# Patient Record
Sex: MALE | Race: WHITE | NOT HISPANIC OR LATINO | Employment: OTHER | URBAN - METROPOLITAN AREA
[De-identification: names, ages, dates, MRNs, and addresses within clinical notes are randomized per-mention and may not be internally consistent; named-entity substitution may affect disease eponyms.]

---

## 2017-01-19 ENCOUNTER — ANESTHESIA (OUTPATIENT)
Dept: PERIOP | Facility: HOSPITAL | Age: 62
End: 2017-01-19

## 2017-01-19 ENCOUNTER — HOSPITAL ENCOUNTER (OUTPATIENT)
Facility: HOSPITAL | Age: 62
Setting detail: HOSPITAL OUTPATIENT SURGERY
Discharge: HOME | End: 2017-01-19
Attending: PAIN MEDICINE | Admitting: PAIN MEDICINE

## 2017-01-19 ENCOUNTER — ANESTHESIA EVENT (OUTPATIENT)
Dept: PERIOP | Facility: HOSPITAL | Age: 62
End: 2017-01-19

## 2017-01-19 VITALS
WEIGHT: 182 LBS | TEMPERATURE: 98.2 F | SYSTOLIC BLOOD PRESSURE: 148 MMHG | HEART RATE: 88 BPM | DIASTOLIC BLOOD PRESSURE: 98 MMHG | RESPIRATION RATE: 18 BRPM | OXYGEN SATURATION: 93 % | HEIGHT: 71 IN | BODY MASS INDEX: 25.48 KG/M2

## 2017-01-19 PROBLEM — Z46.2 END OF BATTERY LIFE OF INTRATHECAL INFUSION PUMP: Status: ACTIVE | Noted: 2017-01-19

## 2017-01-19 LAB
DEPRECATED RDW RBC AUTO: 43.5 FL (ref 37–54)
ERYTHROCYTE [DISTWIDTH] IN BLOOD BY AUTOMATED COUNT: 12.8 % (ref 11.3–14.5)
HCT VFR BLD AUTO: 43.8 % (ref 38.9–50.9)
HGB BLD-MCNC: 14.9 G/DL (ref 13.1–17.5)
MCH RBC QN AUTO: 31.5 PG (ref 27–31)
MCHC RBC AUTO-ENTMCNC: 34 G/DL (ref 32–36)
MCV RBC AUTO: 92.6 FL (ref 80–99)
PLATELET # BLD AUTO: 236 10*3/MM3 (ref 150–450)
PMV BLD AUTO: 9.6 FL (ref 6–12)
POTASSIUM BLDA-SCNC: 4.88 MMOL/L (ref 3.5–5.3)
RBC # BLD AUTO: 4.73 10*6/MM3 (ref 4.2–5.76)
WBC NRBC COR # BLD: 8.35 10*3/MM3 (ref 3.5–10.8)

## 2017-01-19 PROCEDURE — 25010000003 CEFAZOLIN IN DEXTROSE 2-4 GM/100ML-% SOLUTION: Performed by: NURSE ANESTHETIST, CERTIFIED REGISTERED

## 2017-01-19 PROCEDURE — 93005 ELECTROCARDIOGRAM TRACING: CPT | Performed by: ANESTHESIOLOGY

## 2017-01-19 PROCEDURE — 85027 COMPLETE CBC AUTOMATED: CPT | Performed by: ANESTHESIOLOGY

## 2017-01-19 PROCEDURE — 25010000002 DEXAMETHASONE PER 1 MG: Performed by: NURSE ANESTHETIST, CERTIFIED REGISTERED

## 2017-01-19 PROCEDURE — 25010000002 PROPOFOL 10 MG/ML EMULSION: Performed by: NURSE ANESTHETIST, CERTIFIED REGISTERED

## 2017-01-19 PROCEDURE — C1772 INFUSION PUMP, PROGRAMMABLE: HCPCS | Performed by: PAIN MEDICINE

## 2017-01-19 PROCEDURE — 25010000002 HYDROMORPHONE PER 4 MG: Performed by: NURSE ANESTHETIST, CERTIFIED REGISTERED

## 2017-01-19 PROCEDURE — 25010000002 ONDANSETRON PER 1 MG: Performed by: NURSE ANESTHETIST, CERTIFIED REGISTERED

## 2017-01-19 PROCEDURE — 25010000002 PHENYLEPHRINE PER 1 ML: Performed by: NURSE ANESTHETIST, CERTIFIED REGISTERED

## 2017-01-19 PROCEDURE — 25010000002 FENTANYL CITRATE (PF) 100 MCG/2ML SOLUTION: Performed by: NURSE ANESTHETIST, CERTIFIED REGISTERED

## 2017-01-19 PROCEDURE — 84132 ASSAY OF SERUM POTASSIUM: CPT | Performed by: ANESTHESIOLOGY

## 2017-01-19 DEVICE — THE PROMETRA II PROGRAMMABLE PUMP IS A STERILE, BATTERY-OPERATED, TEARDROP-SHAPED IMPLANTABLE, PROGRAMMABLE INFUSION PUMP, WITH A RIGID TITANIUM HOUSING AND FLOW CONTROLLER SYSTEM, WHICH DISPENSES INFUSATE INTO THE INTRATHECAL SPACE THROUGH AN IMPLANTED INTRATHECAL CATHETER. TO HELP INCREASE SAFETY, THE PROMETRA II PUMP INCORPORATES A SAFETY VALVE (FLOW-ACTIVATED VALVE OR FAV) THAT WILL SHUT OFF DRUG FLOW TO THE PATIENT IN THE EVENT A HIGH FLOW RATE OCCURS, SUCH AS DURING AN MRI. ALL FUNCTIONS OF THE SYSTEM (E.G., DOSING) ARE CONTROLLED EXTERNALLY USING A HAND-HELD, BATTERY-OPERATED PROGRAMMER. THE PROMETRA II PUMP CONTAINS A METAL BELLOWS DRUG RESERVOIR WITH A CAPACITY OF 20 MILLILITERS (ML). THE RESERVOIR PROPELLANT IS STORED WITHIN THE RIGID HOUSING SURROUNDING THE BELLOWS AND PROVIDES THE DRIVING PRESSURE FOR THE PUMP. THE DRIVING PRESSURE ON THE RESERVOIR FORCES THE INFUSATE THROUGH AN OUTLET FILTER (0.22 PM), AND INTO AN ELECTRONICALLY CONTROLLED FLOW METERING VALVE-ACCUMULATOR SUBSYSTEM. THE INFUMORPH PASSES FROM THE FLOW METERING SUBSYSTEM, INTO THE CATHETER ACCESS PORT THEN INTO THE CATHETER FOR DELIVERY TO THE INTRATHECAL SPACE.
Type: IMPLANTABLE DEVICE | Status: FUNCTIONAL
Brand: PROMETRA II PUMP

## 2017-01-19 RX ORDER — NITROGLYCERIN 0.3 MG/1
0.3 TABLET SUBLINGUAL
COMMUNITY

## 2017-01-19 RX ORDER — FAMOTIDINE 10 MG/ML
20 INJECTION, SOLUTION INTRAVENOUS ONCE
Status: DISCONTINUED | OUTPATIENT
Start: 2017-01-19 | End: 2017-01-19 | Stop reason: HOSPADM

## 2017-01-19 RX ORDER — MAGNESIUM HYDROXIDE 1200 MG/15ML
LIQUID ORAL AS NEEDED
Status: DISCONTINUED | OUTPATIENT
Start: 2017-01-19 | End: 2017-01-19 | Stop reason: HOSPADM

## 2017-01-19 RX ORDER — LABETALOL HYDROCHLORIDE 5 MG/ML
5 INJECTION, SOLUTION INTRAVENOUS
Status: DISCONTINUED | OUTPATIENT
Start: 2017-01-19 | End: 2017-01-19 | Stop reason: HOSPADM

## 2017-01-19 RX ORDER — PROPOFOL 10 MG/ML
VIAL (ML) INTRAVENOUS AS NEEDED
Status: DISCONTINUED | OUTPATIENT
Start: 2017-01-19 | End: 2017-01-19 | Stop reason: SURG

## 2017-01-19 RX ORDER — LIDOCAINE HYDROCHLORIDE 10 MG/ML
INJECTION, SOLUTION INFILTRATION; PERINEURAL AS NEEDED
Status: DISCONTINUED | OUTPATIENT
Start: 2017-01-19 | End: 2017-01-19 | Stop reason: SURG

## 2017-01-19 RX ORDER — IPRATROPIUM BROMIDE AND ALBUTEROL SULFATE 2.5; .5 MG/3ML; MG/3ML
3 SOLUTION RESPIRATORY (INHALATION) ONCE AS NEEDED
Status: DISCONTINUED | OUTPATIENT
Start: 2017-01-19 | End: 2017-01-19 | Stop reason: HOSPADM

## 2017-01-19 RX ORDER — FENTANYL CITRATE 50 UG/ML
INJECTION, SOLUTION INTRAMUSCULAR; INTRAVENOUS AS NEEDED
Status: DISCONTINUED | OUTPATIENT
Start: 2017-01-19 | End: 2017-01-19 | Stop reason: SURG

## 2017-01-19 RX ORDER — BUPIVACAINE HYDROCHLORIDE AND EPINEPHRINE 5; 5 MG/ML; UG/ML
INJECTION, SOLUTION EPIDURAL; INTRACAUDAL; PERINEURAL AS NEEDED
Status: DISCONTINUED | OUTPATIENT
Start: 2017-01-19 | End: 2017-01-19 | Stop reason: HOSPADM

## 2017-01-19 RX ORDER — HYDROMORPHONE HYDROCHLORIDE 1 MG/ML
0.5 INJECTION, SOLUTION INTRAMUSCULAR; INTRAVENOUS; SUBCUTANEOUS
Status: DISCONTINUED | OUTPATIENT
Start: 2017-01-19 | End: 2017-01-19 | Stop reason: HOSPADM

## 2017-01-19 RX ORDER — FENTANYL CITRATE 50 UG/ML
50 INJECTION, SOLUTION INTRAMUSCULAR; INTRAVENOUS
Status: DISCONTINUED | OUTPATIENT
Start: 2017-01-19 | End: 2017-01-19 | Stop reason: HOSPADM

## 2017-01-19 RX ORDER — PROMETHAZINE HYDROCHLORIDE 25 MG/1
25 TABLET ORAL ONCE AS NEEDED
Status: DISCONTINUED | OUTPATIENT
Start: 2017-01-19 | End: 2017-01-19 | Stop reason: HOSPADM

## 2017-01-19 RX ORDER — CLOPIDOGREL BISULFATE 75 MG/1
75 TABLET ORAL DAILY
COMMUNITY

## 2017-01-19 RX ORDER — DEXAMETHASONE SODIUM PHOSPHATE 4 MG/ML
INJECTION, SOLUTION INTRA-ARTICULAR; INTRALESIONAL; INTRAMUSCULAR; INTRAVENOUS; SOFT TISSUE AS NEEDED
Status: DISCONTINUED | OUTPATIENT
Start: 2017-01-19 | End: 2017-01-19 | Stop reason: SURG

## 2017-01-19 RX ORDER — CARVEDILOL 6.25 MG/1
6.25 TABLET ORAL 2 TIMES DAILY WITH MEALS
COMMUNITY

## 2017-01-19 RX ORDER — OXYCODONE AND ACETAMINOPHEN 7.5; 325 MG/1; MG/1
1 TABLET ORAL ONCE AS NEEDED
Status: COMPLETED | OUTPATIENT
Start: 2017-01-19 | End: 2017-01-19

## 2017-01-19 RX ORDER — SODIUM CHLORIDE 0.9 % (FLUSH) 0.9 %
1-10 SYRINGE (ML) INJECTION AS NEEDED
Status: DISCONTINUED | OUTPATIENT
Start: 2017-01-19 | End: 2017-01-19 | Stop reason: HOSPADM

## 2017-01-19 RX ORDER — PROMETHAZINE HYDROCHLORIDE 25 MG/ML
6.25 INJECTION, SOLUTION INTRAMUSCULAR; INTRAVENOUS ONCE AS NEEDED
Status: DISCONTINUED | OUTPATIENT
Start: 2017-01-19 | End: 2017-01-19 | Stop reason: HOSPADM

## 2017-01-19 RX ORDER — TRAZODONE HYDROCHLORIDE 50 MG/1
50 TABLET ORAL NIGHTLY
COMMUNITY
End: 2021-12-06

## 2017-01-19 RX ORDER — PROMETHAZINE HYDROCHLORIDE 25 MG/1
25 SUPPOSITORY RECTAL ONCE AS NEEDED
Status: DISCONTINUED | OUTPATIENT
Start: 2017-01-19 | End: 2017-01-19 | Stop reason: HOSPADM

## 2017-01-19 RX ORDER — FLUVOXAMINE MALEATE 50 MG/1
100 TABLET, COATED ORAL NIGHTLY
COMMUNITY
End: 2022-03-04

## 2017-01-19 RX ORDER — CEFAZOLIN SODIUM 2 G/100ML
INJECTION, SOLUTION INTRAVENOUS AS NEEDED
Status: DISCONTINUED | OUTPATIENT
Start: 2017-01-19 | End: 2017-01-19 | Stop reason: SURG

## 2017-01-19 RX ORDER — BUSPIRONE HYDROCHLORIDE 15 MG/1
30 TABLET ORAL 2 TIMES DAILY
COMMUNITY
End: 2022-03-04

## 2017-01-19 RX ORDER — FAMOTIDINE 20 MG/1
20 TABLET, FILM COATED ORAL ONCE
Status: COMPLETED | OUTPATIENT
Start: 2017-01-19 | End: 2017-01-19

## 2017-01-19 RX ORDER — ATORVASTATIN CALCIUM 40 MG/1
40 TABLET, FILM COATED ORAL NIGHTLY
COMMUNITY
End: 2022-03-11

## 2017-01-19 RX ORDER — MELOXICAM 15 MG/1
15 TABLET ORAL DAILY PRN
COMMUNITY

## 2017-01-19 RX ORDER — LIDOCAINE HYDROCHLORIDE 10 MG/ML
1 INJECTION, SOLUTION EPIDURAL; INFILTRATION; INTRACAUDAL; PERINEURAL ONCE
Status: COMPLETED | OUTPATIENT
Start: 2017-01-19 | End: 2017-01-19

## 2017-01-19 RX ORDER — ONDANSETRON 2 MG/ML
INJECTION INTRAMUSCULAR; INTRAVENOUS AS NEEDED
Status: DISCONTINUED | OUTPATIENT
Start: 2017-01-19 | End: 2017-01-19 | Stop reason: SURG

## 2017-01-19 RX ORDER — LIDOCAINE HYDROCHLORIDE AND EPINEPHRINE 10; 10 MG/ML; UG/ML
INJECTION, SOLUTION INFILTRATION; PERINEURAL AS NEEDED
Status: DISCONTINUED | OUTPATIENT
Start: 2017-01-19 | End: 2017-01-19 | Stop reason: HOSPADM

## 2017-01-19 RX ORDER — LISINOPRIL 5 MG/1
5 TABLET ORAL 2 TIMES DAILY
COMMUNITY

## 2017-01-19 RX ORDER — SODIUM CHLORIDE, SODIUM LACTATE, POTASSIUM CHLORIDE, CALCIUM CHLORIDE 600; 310; 30; 20 MG/100ML; MG/100ML; MG/100ML; MG/100ML
9 INJECTION, SOLUTION INTRAVENOUS CONTINUOUS
Status: DISCONTINUED | OUTPATIENT
Start: 2017-01-19 | End: 2017-01-19 | Stop reason: HOSPADM

## 2017-01-19 RX ORDER — CLOPIDOGREL BISULFATE 75 MG/1
75 TABLET ORAL ONCE
Status: COMPLETED | OUTPATIENT
Start: 2017-01-19 | End: 2017-01-19

## 2017-01-19 RX ORDER — OMEPRAZOLE 20 MG/1
20 CAPSULE, DELAYED RELEASE ORAL DAILY
COMMUNITY
End: 2022-06-24

## 2017-01-19 RX ORDER — ASPIRIN 81 MG/1
81 TABLET, CHEWABLE ORAL DAILY
COMMUNITY

## 2017-01-19 RX ADMIN — PROPOFOL 200 MG: 10 INJECTION, EMULSION INTRAVENOUS at 13:06

## 2017-01-19 RX ADMIN — CLOPIDOGREL BISULFATE 75 MG: 75 TABLET ORAL at 12:59

## 2017-01-19 RX ADMIN — DEXAMETHASONE SODIUM PHOSPHATE 8 MG: 4 INJECTION, SOLUTION INTRAMUSCULAR; INTRAVENOUS at 13:11

## 2017-01-19 RX ADMIN — FENTANYL CITRATE 50 MCG: 50 INJECTION, SOLUTION INTRAMUSCULAR; INTRAVENOUS at 14:40

## 2017-01-19 RX ADMIN — PHENYLEPHRINE HYDROCHLORIDE 100 MCG: 10 INJECTION INTRAVENOUS at 13:25

## 2017-01-19 RX ADMIN — PHENYLEPHRINE HYDROCHLORIDE 100 MCG: 10 INJECTION INTRAVENOUS at 13:35

## 2017-01-19 RX ADMIN — FENTANYL CITRATE 50 MCG: 50 INJECTION, SOLUTION INTRAMUSCULAR; INTRAVENOUS at 14:55

## 2017-01-19 RX ADMIN — HYDROMORPHONE HYDROCHLORIDE 0.5 MG: 1 INJECTION, SOLUTION INTRAMUSCULAR; INTRAVENOUS; SUBCUTANEOUS at 15:10

## 2017-01-19 RX ADMIN — ONDANSETRON 4 MG: 2 INJECTION INTRAMUSCULAR; INTRAVENOUS at 13:51

## 2017-01-19 RX ADMIN — HYDROMORPHONE HYDROCHLORIDE 0.5 MG: 1 INJECTION, SOLUTION INTRAMUSCULAR; INTRAVENOUS; SUBCUTANEOUS at 15:30

## 2017-01-19 RX ADMIN — FENTANYL CITRATE 25 MCG: 50 INJECTION, SOLUTION INTRAMUSCULAR; INTRAVENOUS at 13:06

## 2017-01-19 RX ADMIN — OXYCODONE HYDROCHLORIDE AND ACETAMINOPHEN 1 TABLET: 7.5; 325 TABLET ORAL at 15:20

## 2017-01-19 RX ADMIN — CEFAZOLIN SODIUM 2 G: 2 INJECTION, SOLUTION INTRAVENOUS at 13:10

## 2017-01-19 RX ADMIN — LIDOCAINE HYDROCHLORIDE 1 ML: 10 INJECTION, SOLUTION EPIDURAL; INFILTRATION; INTRACAUDAL; PERINEURAL at 12:40

## 2017-01-19 RX ADMIN — FAMOTIDINE 20 MG: 20 TABLET, FILM COATED ORAL at 12:40

## 2017-01-19 RX ADMIN — SODIUM CHLORIDE, POTASSIUM CHLORIDE, SODIUM LACTATE AND CALCIUM CHLORIDE 9 ML/HR: 600; 310; 30; 20 INJECTION, SOLUTION INTRAVENOUS at 12:40

## 2017-01-19 RX ADMIN — LIDOCAINE HYDROCHLORIDE 50 MG: 10 INJECTION, SOLUTION INFILTRATION; PERINEURAL at 13:06

## 2017-01-19 NOTE — H&P
Patient Care Team:      Chief complaint : Chronic pain in low back.    Subjective:    Patient is a 61 y.o.male presents with c/o chronic pain and is here for pain pump placement.    Review of Systems:  General ROS: negative for - chills, fatigue, fever or malaise  Cardiovascular ROS: no chest pain or dyspnea on exertion. H/o CAD with 3 stents.  Respiratory ROS: no cough, shortness of breath, or wheezing      Allergies:   Allergies   Allergen Reactions   • Penicillins Shortness Of Breath          Latex: Denies  Contrast Dye: Denies    Home Meds    Prescriptions Prior to Admission   Medication Sig Dispense Refill Last Dose   • aspirin 81 MG chewable tablet Chew 81 mg Daily.   1/19/2017 at 0800   • atorvastatin (LIPITOR) 40 MG tablet Take 40 mg by mouth Every Night.   1/19/2017 at 0800   • busPIRone (BUSPAR) 15 MG tablet Take 30 mg by mouth 2 (Two) Times a Day.   1/19/2017 at 0800   • carvedilol (COREG) 6.25 MG tablet Take 6.25 mg by mouth 2 (Two) Times a Day With Meals.   1/19/2017 at 0800   • clopidogrel (PLAVIX) 75 MG tablet Take 75 mg by mouth Daily.   1/18/2017 at Unknown time   • fluvoxaMINE (LUVOX) 50 MG tablet Take 100 mg by mouth Every Night. 200 mg in AM  100 mg in PM   1/19/2017 at 0800   • lisinopril (PRINIVIL,ZESTRIL) 5 MG tablet Take 5 mg by mouth 2 (Two) Times a Day.   1/19/2017 at 0800   • meloxicam (MOBIC) 15 MG tablet Take 15 mg by mouth Daily As Needed.   Past Week at Unknown time   • omeprazole (priLOSEC) 20 MG capsule Take 20 mg by mouth Daily.   1/19/2017 at 0800   • traZODone (DESYREL) 50 MG tablet Take 50 mg by mouth Every Night.   Past Week at Unknown time   • nitroglycerin (NITROSTAT) 0.3 MG SL tablet Place 0.3 mg under the tongue Every 5 (Five) Minutes As Needed for chest pain. Take no more than 3 doses in 15 minutes.   Unknown at Unknown time     PMH:   Past Medical History   Diagnosis Date   • Arthritis    • Coronary artery disease    • Hypertension    • Sleep apnea      PSH:    Past  Surgical History   Procedure Laterality Date   • Lumbar discectomy anterior posterior fusion instrumentation  2011     [facility]    • Pain pump insertion/revision     • Appendectomy     • Cardiac catheterization  2015     3 stents     Immunization History: pneumo: No  Flu: No   Tetanus: Unknown    Social History:   Tobacco: Former quit 2012   Alcohol: No      Physical Exam:    General Appearance:    Alert, cooperative, no distress, appears stated age   Head:    Normocephalic, without obvious abnormality, atraumatic   Lungs:     Clear to auscultation bilaterally, respirations unlabored    Heart: Regular rate and rhythm, S1 and S2 normal, no murmur, rub    or gallop    Abdomen:    Soft without tenderness   Breast Exam:    deferred   Genitalia:    deferred   Extremities:   Extremities normal, atraumatic, no cyanosis or edema   Skin:   Skin color, texture, turgor normal, no rashes or lesions   Neurologic:   Grossly intact     Results Review: labs were reviewed    Impression: Chronic pain syndrome    Plan: Intrathecal pain pump placement      GHADA Reyna 1/19/2017 12:23 PM

## 2017-01-19 NOTE — ANESTHESIA POSTPROCEDURE EVALUATION
Patient: Fam Tao    Procedure Summary     Date Anesthesia Start Anesthesia Stop Room / Location    01/19/17 1302  [facility] / [facility]       Procedure Diagnosis Surgeon Provider    INTRARHECAL PAIN PUMP REPLACEMENT (N/A ) No diagnosis on file. MD Alex Montgomery MD          Anesthesia Type: general  Last vitals  BP   146/83   Temp   98.1   Pulse  85   Resp   15   SpO2   98     Post Anesthesia Care and Evaluation    Patient location during evaluation: PACU  Patient participation: complete - patient participated  Level of consciousness: awake and alert  Pain score: 0  Pain management: adequate  Airway patency: patent  Anesthetic complications: No anesthetic complications  PONV Status: none  Cardiovascular status: hemodynamically stable and acceptable  Respiratory status: nonlabored ventilation, acceptable and nasal cannula  Hydration status: acceptable

## 2017-01-19 NOTE — OP NOTE
DATE OF PROCEDURE:  01/19/2017    PREOPERATIVE DIAGNOSES:  1. Status post laminectomy syndrome.   2. Pain pump battery end of life.   3. Medical drug dependence.     POSTOPERATIVE DIAGNOSES:  1. Status post laminectomy syndrome.   2. Pain pump battery end of life.   3. Medical drug dependence.     PROCEDURE PERFORMED: Pain pump replacement with pain pump pocket revision.     SURGEON: Nir Gilman II, MD     ASSISTANT: None.     ANESTHESIA: Per anesthesia. General anesthesia was used. No apparent complications from the anesthesia.     DESCRIPTION OF PROCEDURE: After obtaining informed consent, the patient was taken to the OR where he was placed in the supine position. Appropriate monitors, including blood pressure cuff, EKG and pulse oximetry were placed. The area of the left lower abdomen was then prepped with a DuraPrep solution and draped in a sterile fashion. An Ioban was placed over the surgical site. I infiltrated approximately 20 mL of a 0.5% bupivacaine with epinephrine and 1% lidocaine solution along the 7 cm incision line on the abdomen. An elliptical incision was made to excise the old scar. Tissues were then dissected down to the level of the pain pump which was carefully exposed. The pain pump was then extracted from the existing pain pump pocket taking care not to damage the intrathecal catheter. This catheter was then freed up from the scar tissue and the Dacron pouch surrounding the pain pump freed from the subcutaneous fat. This allowed me to remodel the pain pump pocket to accommodate the new Flowonix Pain Pump. The pump was prepared prior to implant with a solution of 20 mL of 8 mg/mL Dilaudid and 16 mg/mL of bupivacaine. The pain pump was primed prior to placement. The existing intrathecal catheter  of 71 cm length was then connected to the Flowonix Pain Pump. A sutureless sleeve was used to secure the catheter to the pain pump. The pain pump was then placed into the revised pocket after  copious irrigation with orthopedic antibiotic irrigation. This site was then closed first using a 2-0 interrupted Vicryl stitch followed by a 3-0 subcuticular stitch. SureClose was applied to the skin and a Steri-Strip applied to the incision sites. Sterile Tegaderm dressing was applied. The patient was then transferred to the recovery room where he was noted to be neurologically intact. He will be discharged to home. He will follow up on 01/27/2017 for removal of suture.      MD FAUZIA Mcdaniels II/jaspreet  DD: 01/19/2017 16:37:31  DT: 01/19/2017 18:20:13  Voice Rec. ID #[id number]  Voice Original ID #[id number]  Doc ID #[id number]  Rev. #0  cc:

## 2017-01-19 NOTE — IP AVS SNAPSHOT
AFTER VISIT SUMMARY             Fam Tao           About your hospitalization     You were admitted on:  January 19, 2017 You last received care in the:  [facility] OR       Procedures & Surgeries      Procedure(s) (LRB):  INTRARHECAL PAIN PUMP REPLACEMENT (N/A)     1/19/2017     Surgeon(s):  Nir Gilman MD  -------------------      Medications    If you or your caregiver advised us that you are currently taking a medication and that medication is marked below as “Resume”, this simply indicates that we have reviewed those medications to make sure our new therapy recommendations do not interfere.  If you have concerns about medications other than those new ones which we are prescribing today, please consult the physician who prescribed them (or your primary physician).  Our review of your home medications is not meant to indicate that we are directing their use.             Your Medications      CONTINUE taking these medications     aspirin 81 MG chewable tablet   Chew 81 mg Daily.           atorvastatin 40 MG tablet   Take 40 mg by mouth Every Night.   Commonly known as:  LIPITOR           busPIRone 15 MG tablet   Take 30 mg by mouth 2 (Two) Times a Day.   Commonly known as:  BUSPAR           carvedilol 6.25 MG tablet   Take 6.25 mg by mouth 2 (Two) Times a Day With Meals.   Commonly known as:  COREG           clopidogrel 75 MG tablet   Take 75 mg by mouth Daily.   Last time this was given:  1/19/2017 12:59 PM   Commonly known as:  PLAVIX           fluvoxaMINE 50 MG tablet   Take 100 mg by mouth Every Night. 200 mg in  mg in PM   Commonly known as:  LUVOX           lisinopril 5 MG tablet   Take 5 mg by mouth 2 (Two) Times a Day.   Commonly known as:  PRINIVIL,ZESTRIL           meloxicam 15 MG tablet   Take 15 mg by mouth Daily As Needed.   Commonly known as:  MOBIC           NITROSTAT 0.3 MG SL tablet   Place 0.3 mg under the tongue Every 5 (Five) Minutes As Needed for chest  pain. Take no more than 3 doses in 15 minutes.   Generic drug:  nitroglycerin           omeprazole 20 MG capsule   Take 20 mg by mouth Daily.   Commonly known as:  priLOSEC           traZODone 50 MG tablet   Take 50 mg by mouth Every Night.   Commonly known as:  DESYREL                      Your Medications      Your Medication List           Morning Noon Evening Bedtime As Needed    aspirin 81 MG chewable tablet   Chew 81 mg Daily.                                atorvastatin 40 MG tablet   Take 40 mg by mouth Every Night.   Commonly known as:  LIPITOR                                busPIRone 15 MG tablet   Take 30 mg by mouth 2 (Two) Times a Day.   Commonly known as:  BUSPAR                                carvedilol 6.25 MG tablet   Take 6.25 mg by mouth 2 (Two) Times a Day With Meals.   Commonly known as:  COREG                                clopidogrel 75 MG tablet   Take 75 mg by mouth Daily.   Commonly known as:  PLAVIX                                fluvoxaMINE 50 MG tablet   Take 100 mg by mouth Every Night. 200 mg in  mg in PM   Commonly known as:  LUVOX                                lisinopril 5 MG tablet   Take 5 mg by mouth 2 (Two) Times a Day.   Commonly known as:  PRINIVIL,ZESTRIL                                meloxicam 15 MG tablet   Take 15 mg by mouth Daily As Needed.   Commonly known as:  MOBIC                                NITROSTAT 0.3 MG SL tablet   Place 0.3 mg under the tongue Every 5 (Five) Minutes As Needed for chest pain. Take no more than 3 doses in 15 minutes.   Generic drug:  nitroglycerin                                omeprazole 20 MG capsule   Take 20 mg by mouth Daily.   Commonly known as:  priLOSEC                                traZODone 50 MG tablet   Take 50 mg by mouth Every Night.   Commonly known as:  DESYREL                                         Instructions for After Discharge        Discharge References/Attachments     GENERAL ANESTHESIA, ADULT, CARE AFTER  (ENGLISH)    IMPLANTABLE PAIN PUMP, HOME CARE (ENGLISH)       Follow-ups for After Discharge        Follow-up Information     Follow up with Nir Gilman MD Follow up on 2017.    Specialty:  Pain Medicine    Why:  2:40PM    Contact information:    [address]  [place] [address] [place] [id number]  [phone]        [employer] Signup     [facility] allows you to send messages to your doctor, view your test results, renew your prescriptions, schedule appointments, and more. To sign up, go to [employer] and click on the Sign Up Now link in the New User? box. Enter your [employer] Activation Code exactly as it appears below along with the last four digits of your Social Security Number and your Date of Birth () to complete the sign-up process. If you do not sign up before the expiration date, you must request a new code.    [employer] Activation Code: [email or web address]  Expires: 2017  2:55 PM    If you have questions, you can email [email or web address] or call [phone] to talk to our [employer] staff. Remember, [employer] is NOT to be used for urgent needs. For medical emergencies, dial 911.           Summary of Your Hospitalization        Reason for Hospitalization     Your primary diagnosis was:  Fitting/Adjustment Of Devices Related To Nervous System/Special Senses      Care Providers     Provider Service Role Specialty    Nir Gilman MD Pain Management Attending Provider Pain Medicine    Nir Gilman MD Pain Management Surgeon  Pain Medicine      Your Allergies  Date Reviewed: 2017    Allergen Reactions    Penicillins Shortness Of Breath      Patient Belongings Returned     Document Return of Belongings Flowsheet     Were the patient bedside belongings sent home?   --   Belongings Retrieved from Security & Sent Home   --    Belongings Sent to Safe   --   Medications Retrieved from Pharmacy & Sent Home   --              More Information      General  Anesthesia, Adult, Care After  Refer to this sheet in the next few weeks. These instructions provide you with information on caring for yourself after your procedure. Your health care provider may also give you more specific instructions. Your treatment has been planned according to current medical practices, but problems sometimes occur. Call your health care provider if you have any problems or questions after your procedure.  WHAT TO EXPECT AFTER THE PROCEDURE  After the procedure, it is typical to experience:  · Sleepiness.  · Nausea and vomiting.  HOME CARE INSTRUCTIONS  · For the first 24 hours after general anesthesia:  ¨ Have a responsible person with you.  ¨ Do not drive a car. If you are alone, do not take public transportation.  ¨ Do not drink alcohol.  ¨ Do not take medicine that has not been prescribed by your health care provider.  ¨ Do not sign important papers or make important decisions.  ¨ You may resume a normal diet and activities as directed by your health care provider.  · Change bandages (dressings) as directed.  · If you have questions or problems that seem related to general anesthesia, call the hospital and ask for the anesthetist or anesthesiologist on call.  SEEK MEDICAL CARE IF:  · You have nausea and vomiting that continue the day after anesthesia.  · You develop a rash.  SEEK IMMEDIATE MEDICAL CARE IF:   · You have difficulty breathing.  · You have chest pain.  · You have any allergic problems.     This information is not intended to replace advice given to you by your health care provider. Make sure you discuss any questions you have with your health care provider.     Document Released: 03/26/2002 Document Revised: 01/08/2016 Document Reviewed: 04/17/2013  [employer] Interactive Patient Education ©2016 [employer]          Implantable Pain Pump Home Care  An implanted pain pump is a small device, about the size of a hockey puck. It is put under your skin (implanted) during surgery.  Attached to it is a catheter (small plastic tube). The tube goes into either a vein or into the space around your spinal cord. The pump has a space (reservoir) where the pain medication is stored. The device pumps it from the reservoir into your body at a regular pre-set rate. Sometimes your health care provider will set the pump to give you pain medicine when you need it. This might be in a steady flow. Or, it might be different amounts at different times. The reservoir can be easily refilled when it runs low.  An implanted pump puts the medication right where it needs to go to relieve your pain. This often means less medication will be needed. That should result in fewer side effects.   HOME CARE   An implanted pump can stay in place for a long time. But do not worry: It can be removed at any time. For example, if your condition changes or if the pump no longer helps, it can be taken out. While you have an implanted pump:  · You will need to make regular visits to your health care provider. During these visits:    The pump will be refilled with pain medicine. Your health care provider will insert a needle through your skin into the pump. Medicine will flow through the needle into the reservoir. Refills are usually needed every 4 to 8 weeks. Refill time varies from person to person. Be sure to ask how often your pump will need a refill.    Your medication can be adjusted. The amount you get can be changed. How often the medicine is pumped into your body also can be changed. These changes will depend on how well the device is reducing your pain. Be honest when describing your pain to your health care providers. They need this information to make sure you get the right amount and right type of pain medicine. It also will depend on side effects. Explain any symptoms you have, even if you do not think they have anything to do with your pain pump. Tell your caregiver if you feel sick to your stomach, if you are overly  sleepy, or if your skin itches. Your health care provider will know whether the medicine might be causing these or other side effects. The goal is to give you enough medicine to ease your pain but not so much that you have side effects.    The pump will be checked to make sure it is working properly. The battery in the pump often lasts up to ten years. The pump will beep if a new battery is needed. It also will beep if it needs a refill or if there is another problem.  Ask your health care provider whether you can take over-the-counter medicines for aches or fever. Which other medicines you can take could depend on the type of pain medicine in your pump.   At first, you will need to restrict your movement and activities.  · For 6 to 8 weeks:    Do not sleep on your stomach.    Avoid excessive bending and stretching.    Do not raise your arms over your head.    Do not lift anything that weighs more than 5 pounds.  · Do not drive for at least two weeks (or until your health care provider says it is okay).  · Do not take a tub bath for about a month. Shower only.  · Do not do work around the house (inside or outside) until your health care provider gives you the go-ahead. For example, do not load the . Do not vacuum. Do not mow the yard.  Over the long term, you may not have many restrictions. Just remember to return to your regular activities gradually. Ask your health care provider whether physical therapy would help. You still need to be careful in some situations:  · Always keep an eye on the skin that covers the pump. Call your health care provider if it becomes red, warm or swollen. Call if the area is painful or if the incision starts to leak blood or any liquid. These could be signs of infection.  · Get a special ID card that proves you have an implanted pump. It is called an Implanted Device Identification Card. To get this, you might need a letter or form signed by your health care provider. Ask  your health care provider how to get this ID card. You will need this card when traveling. It is important to have in case of an emergency. Carry it with you at all times.  · You should alert airport security checkers that you have an implanted pump. The pump will set off the alarms in the security checkpoints. Show your ID card. Ask to be checked with a security wand.  · Do not worry about being around microwave ovens, cell phones, or other electronic devices. They will not harm the pump.  · Make sure that family members and close friends know that you have an implanted pump. You might want to tell some co-workers, too. People need to know this in case of an emergency.  SEEK MEDICAL CARE IF:   · An incision becomes red, swollen, or painful.  · An incision leaks fluid or blood.  · You feel sick to your stomach, you feel more sleepy than usual, or you have itchy skin.  · You have trouble urinating or having a bowel movement.  · You have a bad headache or back pain.  · Your pain is not being relieved by the pump.  · You develop a fever of more than 100.5° F (38.1° C).  SEEK IMMEDIATE MEDICAL CARE IF:  · You have trouble breathing or feel short of breath.  · You have a headache that lasts for more than two days.  · You hear the pump beeping.  · You have sudden symptoms (back pain, weakness in your legs).  · You lose control over urination or bowel movements.  · You develop a fever of more than 102.0° F (38.9° C).     This information is not intended to replace advice given to you by your health care provider. Make sure you discuss any questions you have with your health care provider.     Document Released: 04/05/2010 Document Revised: 01/08/2016 Document Reviewed: 08/08/2016  [employer] Interactive Patient Education ©2016 [employer]         PREVENTING SURGICAL SITE INFECTIONS     Surgical Site Infections FAQs  What is a Surgical Site Infection (SSI)?  A surgical site infection is an infection that occurs after  surgery in the part of the body where the surgery took place. Most patients who have surgery do not develop an infection. However, infections develop in about 1 to 3 out of every 100 patients who have surgery.  Some of the common symptoms of a surgical site infection are:  · Redness and pain around the area where you had surgery  · Drainage of cloudy fluid from your surgical wound  · Fever  Can SSIs be treated?  Yes. Most surgical site infections can be treated with antibiotics. The antibiotic given to you depends on the bacteria (germs) causing the infection. Sometimes patients with SSIs also need another surgery to treat the infection.  What are some of the things that hospitals are doing to prevent SSIs?  To prevent SSIs, doctors, nurses, and other healthcare providers:  · Clean their hands and arms up to their elbows with an antiseptic agent just before the surgery.  · Clean their hands with soap and water or an alcohol-based hand rub before and after caring for each patient.  · May remove some of your hair immediately before your surgery using electric clippers if the hair is in the same area where the procedure will occur. They should not shave you with a razor.  · Wear special hair covers, masks, gowns, and gloves during surgery to keep the surgery area clean.  · Give you antibiotics before your surgery starts. In most cases, you should get antibiotics within 60 minutes before the surgery starts and the antibiotics should be stopped within 24 hours after surgery.  · Clean the skin at the site of your surgery with a special soap that kills germs.  What can I do to help prevent SSIs?  Before your surgery:  · Tell your doctor about other medical problems you may have. Health problems such as allergies, diabetes, and obesity could affect your surgery and your treatment.  · Quit smoking. Patients who smoke get more infections. Talk to your doctor about how you can quit before your surgery.  · Do not shave near  where you will have surgery. Shaving with a razor can irritate your skin and make it easier to develop an infection.  At the time of your surgery:  · Speak up if someone tries to shave you with a razor before surgery. Ask why you need to be shaved and talk with your surgeon if you have any concerns.  · Ask if you will get antibiotics before surgery.  After your surgery:  · Make sure that your healthcare providers clean their hands before examining you, either with soap and water or an alcohol-based hand rub.    If you do not see your providers clean their hands, please ask them to do so.  · Family and friends who visit you should not touch the surgical wound or dressings.  · Family and friends should clean their hands with soap and water or an alcohol-based hand rub before and after visiting you. If you do not see them clean their hands, ask them to clean their hands.  What do I need to do when I go home from the hospital?  · Before you go home, your doctor or nurse should explain everything you need to know about taking care of your wound. Make sure you understand how to care for your wound before you leave the hospital.  · Always clean your hands before and after caring for your wound.  · Before you go home, make sure you know who to contact if you have questions or problems after you get home.  · If you have any symptoms of an infection, such as redness and pain at the surgery site, drainage, or fever, call your doctor immediately.  If you have additional questions, please ask your doctor or nurse.  Developed and co-sponsored by The Society for Healthcare Epidemiology of [place] (SHEA); Infectious Diseases Society of [place] (IDSA); American Hospital Association; Association for Professionals in Infection Control and Epidemiology (APIC); Centers for Disease Control and Prevention (CDC); and The Joint Commission.     This information is not intended to replace advice given to you by your health care provider. Make  sure you discuss any questions you have with your health care provider.     Document Released: 12/23/2014 Document Revised: 01/08/2016 Document Reviewed: 03/02/2016  [employer] Interactive Patient Education ©2016 [employer]             SYMPTOMS OF A STROKE    Call 911 or have someone take you to the Emergency Department if you have any of the following:    · Sudden numbness or weakness of your face, arm or leg especially on one side of the body  · Sudden confusion, diffiiculty speaking or trouble understanding   · Changes in your vision or loss of sight in one eye  · Sudden severe headache with no known cause  · sudden dizziness, trouble walking, loss of balance or coordination    It is important to seek emergency care right away if you have further stroke symptoms. If you get emergency help quickly, the powerful clot-dissolving medicines can reduce the disabilities caused by a stroke.     For more information:    American Stroke Association  0-743-3-STROKE  www.strokeassociation.org           IF YOU SMOKE OR USE TOBACCO PLEASE READ THE FOLLOWING:    Why is smoking bad for me?  Smoking increases the risk of heart disease, lung disease, vascular disease, stroke, and cancer.     If you smoke, STOP!    If you would like more information on quitting smoking, please visit the appssavvy website: www.EyeEm/Benbriaate/healthier-together/smoke   This link will provide additional resources including the QUIT line and the Beat the Pack support groups.     For more information:    American Cancer Society  ([phone] [phone]    American Heart Association  1-228.979.5076               YOU ARE THE MOST IMPORTANT FACTOR IN YOUR RECOVERY.     Follow all instructions carefully.     I have reviewed my discharge instructions with my nurse, including the following information, if applicable:     Information about my illness and diagnosis   Follow up appointments (including lab draws)   Wound Care   Equipment  Needs   Medications (new and continuing) along with side effects   Preventative information such as vaccines and smoking cessations   Diet   Pain   I know when to contact my Doctor's office or seek emergency care      I want my nurse to describe the side effects of my medications: YES NO   If the answer is no, I understand the side effects of my medications: YES NO   My nurse described the side effects of my medications in a way that I could understand: YES NO   I have taken my personal belongings and my own medications with me at discharge: YES NO            I have received this information and my questions have been answered. I have discussed any concerns I see with this plan with the nurse or physician. I understand these instructions.    Signature of Patient or Responsible Person: _____________________________________    Date: _________________  Time: __________________    Signature of Healthcare Provider: _______________________________________  Date: _________________  Time: __________________

## 2017-01-19 NOTE — ANESTHESIA PREPROCEDURE EVALUATION
Anesthesia Evaluation      Airway   Mallampati: I  TM distance: <3 FB  Neck ROM: full  no difficulty expected  Dental - normal exam     Pulmonary - normal exam   Cardiovascular - normal exam  (+) past MI , CAD, cardiac stents     Neuro/Psych  GI/Hepatic/Renal/Endo      Musculoskeletal     Abdominal  - normal exam    Bowel sounds: normal.   Substance History      OB/GYN          Other                             Anesthesia Plan    ASA 3     general     intravenous induction

## 2017-12-27 ENCOUNTER — HOSPITAL ENCOUNTER (OUTPATIENT)
Age: 62
End: 2017-12-27
Payer: MEDICARE

## 2017-12-27 DIAGNOSIS — I25.10: ICD-10-CM

## 2017-12-27 DIAGNOSIS — R07.9: Primary | ICD-10-CM

## 2017-12-27 DIAGNOSIS — E78.5: ICD-10-CM

## 2017-12-27 DIAGNOSIS — I10: ICD-10-CM

## 2017-12-27 PROCEDURE — 36415 COLL VENOUS BLD VENIPUNCTURE: CPT

## 2017-12-27 PROCEDURE — 80048 BASIC METABOLIC PNL TOTAL CA: CPT

## 2017-12-27 PROCEDURE — 93005 ELECTROCARDIOGRAM TRACING: CPT

## 2017-12-27 PROCEDURE — 82550 ASSAY OF CK (CPK): CPT

## 2017-12-27 PROCEDURE — 82553 CREATINE MB FRACTION: CPT

## 2017-12-27 PROCEDURE — 84484 ASSAY OF TROPONIN QUANT: CPT

## 2018-07-09 ENCOUNTER — HOSPITAL ENCOUNTER (OUTPATIENT)
Age: 63
End: 2018-07-09
Payer: MEDICARE

## 2018-07-09 DIAGNOSIS — I10: ICD-10-CM

## 2018-07-09 DIAGNOSIS — E78.5: Primary | ICD-10-CM

## 2018-07-09 LAB
ALBUMIN LEVEL: 4.1 GM/DL (ref 3.4–5)
ALP ISO SERPL-ACNC: 124 U/L (ref 46–116)
ALT SERPLBLD-CCNC: 48 U/L (ref 12–78)
AST SERPL QL: 25 U/L (ref 15–37)
BILIRUB DIRECT SERPL-MCNC: 0.1 MG/DL (ref 0–0.2)
BILIRUB INDIRECT SERPL-MCNC: 0.3 MG/DL (ref 0–0.9)
BILIRUBIN,TOTAL: 0.4 MG/DL (ref 0.2–1)
CHOLEST SPEC-SCNC: 226 MG/DL (ref 140–200)
HDLC SERPL-MCNC: 59 MG/DL (ref 27–67)
PROT SERPL-MCNC: 7.2 GM/DL (ref 6.4–8.2)
TRIGLYCERIDES: 63 MG/DL (ref 30–200)

## 2018-07-09 PROCEDURE — 36415 COLL VENOUS BLD VENIPUNCTURE: CPT

## 2018-07-09 PROCEDURE — 80076 HEPATIC FUNCTION PANEL: CPT

## 2018-07-09 PROCEDURE — 80061 LIPID PANEL: CPT

## 2018-09-25 ENCOUNTER — HOSPITAL ENCOUNTER (OUTPATIENT)
Age: 63
End: 2018-09-25
Payer: MEDICARE

## 2018-09-25 DIAGNOSIS — I42.9: ICD-10-CM

## 2018-09-25 DIAGNOSIS — R06.09: ICD-10-CM

## 2018-09-25 DIAGNOSIS — J44.9: ICD-10-CM

## 2018-09-25 DIAGNOSIS — Z87.891: ICD-10-CM

## 2018-09-25 DIAGNOSIS — I25.10: ICD-10-CM

## 2018-09-25 DIAGNOSIS — E78.4: Primary | ICD-10-CM

## 2018-09-25 DIAGNOSIS — N18.2: ICD-10-CM

## 2018-09-25 LAB
ALBUMIN LEVEL: 4.2 GM/DL (ref 3.4–5)
ALP ISO SERPL-ACNC: 111 U/L (ref 46–116)
ALT SERPLBLD-CCNC: 57 U/L (ref 12–78)
AST SERPL QL: 26 U/L (ref 15–37)
BILIRUB DIRECT SERPL-MCNC: 0.1 MG/DL (ref 0–0.2)
BILIRUB INDIRECT SERPL-MCNC: 0.3 MG/DL (ref 0–0.9)
BILIRUBIN,TOTAL: 0.4 MG/DL (ref 0.2–1)
CHOLEST SPEC-SCNC: 157 MG/DL (ref 140–200)
HDLC SERPL-MCNC: 64 MG/DL (ref 27–67)
PROT SERPL-MCNC: 7.3 GM/DL (ref 6.4–8.2)
TRIGLYCERIDES: 75 MG/DL (ref 30–200)

## 2018-09-25 PROCEDURE — 80061 LIPID PANEL: CPT

## 2018-09-25 PROCEDURE — 36415 COLL VENOUS BLD VENIPUNCTURE: CPT

## 2018-09-25 PROCEDURE — 80076 HEPATIC FUNCTION PANEL: CPT

## 2020-09-02 ENCOUNTER — HOSPITAL ENCOUNTER (OUTPATIENT)
Age: 65
End: 2020-09-02
Payer: MEDICARE

## 2020-09-02 DIAGNOSIS — I10: ICD-10-CM

## 2020-09-02 DIAGNOSIS — R42: ICD-10-CM

## 2020-09-02 DIAGNOSIS — E78.5: Primary | ICD-10-CM

## 2020-09-02 DIAGNOSIS — R53.83: ICD-10-CM

## 2020-09-02 DIAGNOSIS — I20.9: ICD-10-CM

## 2020-09-02 DIAGNOSIS — R60.9: ICD-10-CM

## 2020-09-02 PROCEDURE — 93306 TTE W/DOPPLER COMPLETE: CPT

## 2020-09-02 PROCEDURE — 78452 HT MUSCLE IMAGE SPECT MULT: CPT

## 2020-09-02 PROCEDURE — 93017 CV STRESS TEST TRACING ONLY: CPT

## 2020-09-02 PROCEDURE — A9502 TC99M TETROFOSMIN: HCPCS

## 2021-03-22 ENCOUNTER — HOSPITAL ENCOUNTER (OUTPATIENT)
Age: 66
End: 2021-03-22
Payer: MEDICARE

## 2021-03-22 DIAGNOSIS — I20.9: ICD-10-CM

## 2021-03-22 DIAGNOSIS — E78.5: Primary | ICD-10-CM

## 2021-03-22 LAB
ALBUMIN LEVEL: 4.4 G/DL (ref 3.5–5)
ALP ISO SERPL-ACNC: 113 U/L (ref 38–126)
ALT SERPLBLD-CCNC: 48 U/L (ref 12–78)
AST SERPL QL: 42 U/L (ref 17–59)
BILIRUB DIRECT SERPL-MCNC: 0 MG/DL (ref 0–0.4)
BILIRUB INDIRECT SERPL-MCNC: 0.4 MG/DL (ref 0–0.9)
BILIRUB INDIRECT SERPL-MCNC: 0.4 MG/DL (ref 0–1.1)
BILIRUBIN,TOTAL: 0.4 MG/DL (ref 0.2–1.3)
CHOLEST SPEC-SCNC: 191 MG/DL (ref 140–200)
HDLC SERPL-MCNC: 61 MG/DL (ref 40–60)
PROT SERPL-MCNC: 7.6 G/DL (ref 6.3–8.2)
TRIGLYCERIDES: 86 MG/DL (ref 30–150)

## 2021-03-22 PROCEDURE — 80061 LIPID PANEL: CPT

## 2021-03-22 PROCEDURE — 80076 HEPATIC FUNCTION PANEL: CPT

## 2021-03-22 PROCEDURE — 36415 COLL VENOUS BLD VENIPUNCTURE: CPT

## 2021-12-06 ENCOUNTER — LAB (OUTPATIENT)
Dept: LAB | Facility: HOSPITAL | Age: 66
End: 2021-12-06

## 2021-12-06 ENCOUNTER — OFFICE VISIT (OUTPATIENT)
Dept: ENDOCRINOLOGY | Facility: CLINIC | Age: 66
End: 2021-12-06

## 2021-12-06 VITALS
SYSTOLIC BLOOD PRESSURE: 118 MMHG | DIASTOLIC BLOOD PRESSURE: 66 MMHG | HEIGHT: 71 IN | WEIGHT: 184 LBS | OXYGEN SATURATION: 98 % | HEART RATE: 87 BPM | BODY MASS INDEX: 25.76 KG/M2

## 2021-12-06 DIAGNOSIS — E05.90 HYPERTHYROIDISM: ICD-10-CM

## 2021-12-06 DIAGNOSIS — E05.90 HYPERTHYROIDISM: Primary | ICD-10-CM

## 2021-12-06 PROCEDURE — 76536 US EXAM OF HEAD AND NECK: CPT | Performed by: INTERNAL MEDICINE

## 2021-12-06 PROCEDURE — 99204 OFFICE O/P NEW MOD 45 MIN: CPT | Performed by: INTERNAL MEDICINE

## 2021-12-06 RX ORDER — EVOLOCUMAB 140 MG/ML
INJECTION, SOLUTION SUBCUTANEOUS
COMMUNITY
Start: 2021-10-19

## 2021-12-06 NOTE — ASSESSMENT & PLAN NOTE
Hx and physical c/d diagnosis of hyperthyroidism  Differential as to the cause would include graves' disease, toxic nodular disease or thyroiditis.    I did a poc ultrasound of the thyroid today    Results of ultrasound:    Diffusely enlarged gland without nodules c/w graves disease      Plan- will recheck tft's and get tsi. Anticipate treatment with methimazole

## 2021-12-06 NOTE — PROGRESS NOTES
"     Office Note      Date: 2021  Patient Name: Fam Tao  MRN: [id number]  : 1955    Cc: abnormal thyroid stuff    History of Present Illness:   Fam Tao is a 66 y.o. male who presents as a new patient for evaluation of abnormal thyroid  He had a low dose CT scan this past summer which showed thyromegaly, then he had labs which showed low tsh with normal t4 .  ------  He feels like his neck is big sometimes  No trouble swallowing.  ------  He has temperature intolerance.  He has been gaining weight.  He has had tremors.  No insomnia.  Perhaps more irritable than usual  Some heart palpitations.  Notes fatigue.        Subjective      Patient was [place] where: [place]  Facial radiation exposure: No.  High iodine intake: No  Family hx of thyroid disease: Yes, describe: mom had thyroid and diabetes .    Review of Systems:   Review of Systems   Constitutional: Positive for fatigue and unexpected weight change.   HENT: Negative for trouble swallowing and voice change.    Eyes: Negative for visual disturbance.   Cardiovascular: Positive for palpitations.   Endocrine: Positive for cold intolerance and heat intolerance.   Skin:        + hair loss    Neurological: Positive for tremors.   Psychiatric/Behavioral: Negative for dysphoric mood and sleep disturbance. The patient is not nervous/anxious.        The following portions of the patient's history were reviewed and updated as appropriate: allergies, current medications, past family history, past medical history, past social history, past surgical history and problem list.    Objective     Visit Vitals  /66 (BP Location: Left arm, Patient Position: Sitting, Cuff Size: Adult)   Pulse 87   Ht 180.3 cm (71\")   Wt 83.5 kg (184 lb)   SpO2 98%   BMI 25.66 kg/m²       Labs:    CBC w/DIFF  Lab Results   Component Value Date    WBC 8.35 2017    RBC 4.73 2017    HGB 14.9 2017    HCT 43.8 2017    MCV 92.6 2017    MCH 31.5 (H) " 01/19/2017    MCHC 34.0 01/19/2017    RDW 12.8 01/19/2017    RDWSD 43.5 01/19/2017    MPV 9.6 01/19/2017     01/19/2017       T4  No results found for: FREET4    TSH  No results found for: TSHBASE     Physical Exam:  Physical Exam  Vitals reviewed.   Constitutional:       Appearance: Normal appearance.   HENT:      Head: Normocephalic and atraumatic.   Eyes:      Extraocular Movements: Extraocular movements intact.   Neck:      Comments: Diffusely enlarged thyroid   Cardiovascular:      Rate and Rhythm: Normal rate and regular rhythm.   Pulmonary:      Effort: Pulmonary effort is normal. No respiratory distress.   Lymphadenopathy:      Cervical: No cervical adenopathy.   Skin:     General: Skin is warm.   Neurological:      Mental Status: He is alert.   Psychiatric:         Mood and Affect: Mood normal.         Thought Content: Thought content normal.         Judgment: Judgment normal.         Assessment / Plan      Assessment & Plan:  Problem List Items Addressed This Visit        Other    Hyperthyroidism - Primary    Current Assessment & Plan     Hx and physical c/d diagnosis of hyperthyroidism  Differential as to the cause would include graves' disease, toxic nodular disease or thyroiditis.    I did a poc ultrasound of the thyroid today    Results of ultrasound:    Diffusely enlarged gland without nodules c/w graves disease      Plan- will recheck tft's and get tsi. Anticipate treatment with methimazole          Relevant Medications    carvedilol (COREG) 6.25 MG tablet    Other Relevant Orders    Thyroid Stimulating Immunoglobulin    T4, Free    TSH    US Thyroid (Completed)    T4, Free    TSH    Thyroid Stimulating Immunoglobulin           James Dixon MD   12/06/2021

## 2021-12-08 LAB
T4 FREE SERPL-MCNC: 1.62 NG/DL (ref 0.82–1.77)
TSH SERPL DL<=0.005 MIU/L-ACNC: 0.21 UIU/ML (ref 0.45–4.5)
TSI SER-ACNC: <0.1 IU/L (ref 0–0.55)

## 2021-12-21 ENCOUNTER — HOSPITAL ENCOUNTER (OUTPATIENT)
Age: 66
End: 2021-12-21
Payer: MEDICARE

## 2021-12-21 DIAGNOSIS — I20.9: ICD-10-CM

## 2021-12-21 DIAGNOSIS — I10: ICD-10-CM

## 2021-12-21 DIAGNOSIS — E78.5: Primary | ICD-10-CM

## 2021-12-21 LAB
ALBUMIN LEVEL: 4.5 G/DL (ref 3.5–5)
ALP ISO SERPL-ACNC: 104 U/L (ref 38–126)
ALT SERPLBLD-CCNC: 38 U/L (ref 12–78)
AST SERPL QL: 41 U/L (ref 17–59)
BILIRUB DIRECT SERPL-MCNC: 0.1 MG/DL (ref 0–0.4)
BILIRUB INDIRECT SERPL-MCNC: 0.3 MG/DL (ref 0–1.1)
BILIRUB INDIRECT SERPL-MCNC: 0.4 MG/DL (ref 0–0.9)
BILIRUBIN,TOTAL: 0.5 MG/DL (ref 0.2–1.3)
CHOLEST SPEC-SCNC: 130 MG/DL (ref 140–200)
HDLC SERPL-MCNC: 71 MG/DL (ref 40–60)
PROT SERPL-MCNC: 7.3 G/DL (ref 6.3–8.2)
TRIGLYCERIDES: 71 MG/DL (ref 30–150)

## 2021-12-21 PROCEDURE — 80076 HEPATIC FUNCTION PANEL: CPT

## 2021-12-21 PROCEDURE — 80061 LIPID PANEL: CPT

## 2021-12-21 PROCEDURE — 36415 COLL VENOUS BLD VENIPUNCTURE: CPT

## 2022-01-24 ENCOUNTER — TELEPHONE (OUTPATIENT)
Dept: FAMILY MEDICINE CLINIC | Facility: CLINIC | Age: 67
End: 2022-01-24

## 2022-01-24 ENCOUNTER — TELEPHONE (OUTPATIENT)
Dept: PEDIATRICS | Facility: OTHER | Age: 67
End: 2022-01-24

## 2022-01-24 NOTE — TELEPHONE ENCOUNTER
Caller: Fam Tao    Relationship: Self    Best call back number: [phone]     What is the best time to reach you: ANYTIME    Who are you requesting to speak with (clinical staff, provider,  specific staff member): CLINICAL    Do you know the name of the person who called: THE PATIENT FAM     What was the call regarding: WANTS TO IF THE PRACTICE HAS RECEIVED HIS PREVIOUS MEDICATION RECORDS THAT HE REQUESTED FROM HIS PREVIOUS PCP.    Do you require a callback: PLEASE CALL THE PATIENT AND ADVISE -[phone]

## 2022-03-04 ENCOUNTER — OFFICE VISIT (OUTPATIENT)
Dept: ENDOCRINOLOGY | Facility: CLINIC | Age: 67
End: 2022-03-04

## 2022-03-04 ENCOUNTER — LAB (OUTPATIENT)
Dept: LAB | Facility: HOSPITAL | Age: 67
End: 2022-03-04

## 2022-03-04 VITALS
OXYGEN SATURATION: 96 % | DIASTOLIC BLOOD PRESSURE: 69 MMHG | HEART RATE: 84 BPM | BODY MASS INDEX: 26.04 KG/M2 | SYSTOLIC BLOOD PRESSURE: 114 MMHG | WEIGHT: 186 LBS | HEIGHT: 71 IN

## 2022-03-04 DIAGNOSIS — E05.90 HYPERTHYROIDISM: ICD-10-CM

## 2022-03-04 DIAGNOSIS — E05.90 HYPERTHYROIDISM: Primary | ICD-10-CM

## 2022-03-04 PROCEDURE — 99213 OFFICE O/P EST LOW 20 MIN: CPT | Performed by: INTERNAL MEDICINE

## 2022-03-04 RX ORDER — BUSPIRONE HYDROCHLORIDE 30 MG/1
TABLET ORAL
COMMUNITY
Start: 2022-02-03

## 2022-03-04 RX ORDER — AMLODIPINE BESYLATE 5 MG/1
TABLET ORAL
COMMUNITY
Start: 2022-02-25

## 2022-03-04 RX ORDER — FLUVOXAMINE MALEATE 100 MG
TABLET ORAL
COMMUNITY
Start: 2022-02-09

## 2022-03-04 NOTE — ASSESSMENT & PLAN NOTE
Pattern of labs consistent with subacute thyroiditis. Will check levls today to make sure it's progressing as it should

## 2022-03-04 NOTE — PROGRESS NOTES
"     Office Note      Date: 2022  Patient Name: Fam Tao  MRN: [id number]  : 1955    Chief Complaint   Patient presents with   • Hyperthyroidism       History of Present Illness:   Fam Tao is a 66 y.o. male who presents for Hyperthyroidism  prior to his visit with me in December he had been significantly hyperthyroid but his labs AT the visit were significantly improved. I assessed him as having had thyroiditis most likely.  He feels ok. His weight is up a couple pounds. No palpitations are noted. He has tremor and feels more cold.   Subjective        Review of Systems:   Review of Systems   Neurological: Positive for tremors.       The following portions of the patient's history were reviewed and updated as appropriate: allergies, current medications, past family history, past medical history, past social history, past surgical history and problem list.    Objective     Visit Vitals  /69   Pulse 84   Ht 180.3 cm (71\")   Wt 84.4 kg (186 lb)   SpO2 96%   BMI 25.94 kg/m²       Labs:    CBC w/DIFF  Lab Results   Component Value Date    WBC 8.35 2017    RBC 4.73 2017    HGB 14.9 2017    HCT 43.8 2017    MCV 92.6 2017    MCH 31.5 (H) 2017    MCHC 34.0 2017    RDW 12.8 2017    RDWSD 43.5 2017    MPV 9.6 2017     2017       T4  Free T4   Date Value Ref Range Status   2021 1.62 0.82 - 1.77 ng/dL Final       TSH  No results found for: TSHBASE     Physical Exam:  Physical Exam  Vitals reviewed.   Constitutional:       Appearance: Normal appearance.   Eyes:      Extraocular Movements: Extraocular movements intact.   Neck:      Comments: + goiter  Musculoskeletal:      Comments: + tremor   Lymphadenopathy:      Cervical: No cervical adenopathy.   Neurological:      Mental Status: He is alert.         Assessment / Plan      Assessment & Plan:  Problem List Items Addressed This Visit        Other    Hyperthyroidism - " Primary    Current Assessment & Plan     Pattern of labs consistent with subacute thyroiditis. Will check levls today to make sure it's progressing as it should         Relevant Medications    carvedilol (COREG) 6.25 MG tablet    Other Relevant Orders    T4, Free    TSH           James Dixon MD   03/04/2022

## 2022-03-05 LAB
T4 FREE SERPL-MCNC: 1.52 NG/DL (ref 0.82–1.77)
TSH SERPL DL<=0.005 MIU/L-ACNC: 0.2 UIU/ML (ref 0.45–4.5)

## 2022-03-08 ENCOUNTER — TELEPHONE (OUTPATIENT)
Dept: FAMILY MEDICINE CLINIC | Facility: CLINIC | Age: 67
End: 2022-03-08

## 2022-03-08 NOTE — TELEPHONE ENCOUNTER
Caller: Fam Tao    Relationship: Self    Best call back number: [phone]    What form or medical record are you requesting: MEDICAL RECORDS    Additional notes: PATIENT IS CALLING IN TO MAKE SURE WE RECEIVED HIS MEDICAL RECORDS FROM THE PREVIOUS DR.    PLEASE CALL AND ADVISE

## 2022-03-09 ENCOUNTER — TELEPHONE (OUTPATIENT)
Dept: ENDOCRINOLOGY | Facility: CLINIC | Age: 67
End: 2022-03-09

## 2022-03-09 RX ORDER — METHIMAZOLE 10 MG/1
10 TABLET ORAL DAILY
Qty: 90 TABLET | Refills: 3 | Status: SHIPPED | OUTPATIENT
Start: 2022-03-09 | End: 2023-03-13 | Stop reason: SDUPTHER

## 2022-03-11 ENCOUNTER — OFFICE VISIT (OUTPATIENT)
Dept: FAMILY MEDICINE CLINIC | Facility: CLINIC | Age: 67
End: 2022-03-11

## 2022-03-11 VITALS
DIASTOLIC BLOOD PRESSURE: 68 MMHG | TEMPERATURE: 98 F | BODY MASS INDEX: 25.9 KG/M2 | WEIGHT: 185 LBS | HEART RATE: 80 BPM | SYSTOLIC BLOOD PRESSURE: 110 MMHG | RESPIRATION RATE: 18 BRPM | HEIGHT: 71 IN

## 2022-03-11 DIAGNOSIS — I10 ESSENTIAL HYPERTENSION: ICD-10-CM

## 2022-03-11 DIAGNOSIS — Z23 NEED FOR SHINGLES VACCINE: ICD-10-CM

## 2022-03-11 DIAGNOSIS — K21.9 GASTROESOPHAGEAL REFLUX DISEASE WITHOUT ESOPHAGITIS: ICD-10-CM

## 2022-03-11 DIAGNOSIS — M47.816 ARTHRITIS OF LUMBAR SPINE: ICD-10-CM

## 2022-03-11 DIAGNOSIS — M25.522 PAIN OF BOTH ELBOWS: ICD-10-CM

## 2022-03-11 DIAGNOSIS — M25.521 PAIN OF BOTH ELBOWS: ICD-10-CM

## 2022-03-11 DIAGNOSIS — G89.29 CHRONIC RIGHT SHOULDER PAIN: Primary | ICD-10-CM

## 2022-03-11 DIAGNOSIS — H61.23 BILATERAL IMPACTED CERUMEN: ICD-10-CM

## 2022-03-11 DIAGNOSIS — E05.90 HYPERTHYROIDISM: ICD-10-CM

## 2022-03-11 DIAGNOSIS — M25.511 CHRONIC RIGHT SHOULDER PAIN: Primary | ICD-10-CM

## 2022-03-11 DIAGNOSIS — F33.42 RECURRENT MAJOR DEPRESSIVE DISORDER, IN FULL REMISSION: ICD-10-CM

## 2022-03-11 DIAGNOSIS — I25.10 CORONARY ARTERY DISEASE INVOLVING NATIVE CORONARY ARTERY OF NATIVE HEART WITHOUT ANGINA PECTORIS: ICD-10-CM

## 2022-03-11 DIAGNOSIS — H91.93 BILATERAL HEARING LOSS, UNSPECIFIED HEARING LOSS TYPE: ICD-10-CM

## 2022-03-11 DIAGNOSIS — F41.1 GENERALIZED ANXIETY DISORDER: ICD-10-CM

## 2022-03-11 PROBLEM — I25.2 HISTORY OF ACUTE MYOCARDIAL INFARCTION: Status: ACTIVE | Noted: 2022-03-11

## 2022-03-11 PROCEDURE — 99203 OFFICE O/P NEW LOW 30 MIN: CPT | Performed by: FAMILY MEDICINE

## 2022-03-11 NOTE — PROGRESS NOTES
Chief Complaint  Establish Care and Shoulder Pain (Right )    Subjective          Fam Tao presents to [facility]  History of Present Illness     Chronic right shoulder pain  The patient presents today with complaints of chronic right shoulder pain that has been ongoing for approximately 6 months. He denies any injury or trauma to the shoulder. He states that he has been applying heat to the shoulder, which has provided some relief. He notes that he does not do a lot with his elbows. The patient states that he does not do a lot with his arms. He notes that the last time he fell was approximately 4 years ago. The patient states that he would rather use the pain pump at this time.    Hypothyroidism  The patient states that he recently found out that he has hyperthyroidism. He notes that he was prescribed methimazole by Dr. Dixon. The patient states that he has a follow-up appointment with Dr. Dixon in 3 months.    History of myocardial infarction  The patient states that he had a heart attack in 06/2015. He notes that he has 3 stents. The patient states that he sees Dr. Parra. He denies any chest pain. The patient states that he is taking carvedilol and Plavix.    Hypertension  The patient states that he has dionicio hypertensive since  2015. Patient is currently taking amlodipine to manage his hypertension.  He notes that he has a family history of heart problems in his mother and father. The patient states that he does not have any brothers and sisters.    Gastroesophageal reflux disease  The patient states that he is taking omeprazole.    Anxiety and depression  The patient states that he sees a psychiatrist in [place]. He notes that he has anxiety and depression. The patient states that he is a lot better than he was 20 years ago. He reports that when he was diagnosed they had him on 35 pills a day. The patient states that he was in the hospital for 1 week after original  "diagnosis. He is currently taking buspirone to mange the anxiety and depression.    Arthritis of the lumbar spine  The patient states that he has arthritis of his back. He states that he sees Dr. Estrada for evaluation and treatment.    History of appendectomy  The patient states that he had his appendix removed in 1970.    Hyperlipidemia  The patient states that he is managing the hyperlipidemia with Repatha injections.    Healthcare maintenance  The patient states that he has never had a shingles vaccine. The patient states that he has had a Cologuard approximately 2 years ago. He notes that he has never had a shingles vaccine. He had the flu vaccination in 10/2021. Patient states that he has had the COVID-19 vaccinations and the pneumonia vaccinations. Patient reports that he stop smoking in 2010.    Review of Systems   Constitutional: Negative.    HENT: Negative.    Cardiovascular: Negative.    Gastrointestinal: Negative.    Musculoskeletal: Positive for arthralgias.   Psychiatric/Behavioral: Positive for depressed mood.        Objective       Vital Signs:   /68   Pulse 80   Temp 98 °F (36.7 °C)   Resp 18   Ht 180.3 cm (71\")   Wt 83.9 kg (185 lb)   BMI 25.80 kg/m²     Physical Exam  Vitals and nursing note reviewed.   Constitutional:       General: He is not in acute distress.     Appearance: Normal appearance. He is well-developed. He is not ill-appearing.   HENT:      Head: Normocephalic and atraumatic.      Right Ear: Tympanic membrane, ear canal and external ear normal. Decreased hearing noted. There is impacted cerumen.      Left Ear: Tympanic membrane, ear canal and external ear normal. Decreased hearing noted. There is impacted cerumen.      Ears:      Comments: Wax is hard. Hearing aids in place     Nose: Nose normal. No congestion or rhinorrhea.      Mouth/Throat:      Mouth: Mucous membranes are moist.      Pharynx: No oropharyngeal exudate or posterior oropharyngeal erythema.   Eyes:      " General: Lids are normal.      Conjunctiva/sclera: Conjunctivae normal.      Pupils: Pupils are equal, round, and reactive to light.   Neck:      Thyroid: No thyromegaly.   Cardiovascular:      Rate and Rhythm: Normal rate and regular rhythm.      Heart sounds: Normal heart sounds. No murmur heard.    No friction rub.   Pulmonary:      Effort: Pulmonary effort is normal. No respiratory distress.      Breath sounds: Normal breath sounds. No wheezing or rales.   Abdominal:      General: Bowel sounds are normal. There is no distension.      Palpations: Abdomen is soft. There is no mass.      Tenderness: There is no abdominal tenderness. There is no guarding or rebound.   Musculoskeletal:      Right shoulder: Bony tenderness present. Decreased range of motion.      Left shoulder: Normal.      Right elbow: No swelling. Decreased range of motion. Tenderness present in lateral epicondyle.      Left elbow: No swelling. Decreased range of motion. Tenderness present in lateral epicondyle.      Cervical back: Normal range of motion and neck supple.      Right lower leg: No edema.      Left lower leg: No edema.      Comments: Empty can test + right .    Skin:     General: Skin is warm and dry.   Neurological:      General: No focal deficit present.      Mental Status: He is alert.   Psychiatric:         Mood and Affect: Mood normal.         Speech: Speech normal.         Behavior: Behavior normal.          Result Review :                     Assessment and Plan    Diagnoses and all orders for this visit:    1. Chronic right shoulder pain (Primary)        - He is not interested in any further testing right now, but we will recommend heat with general range of motion exercises followed by ice. If not improving, he will let me know. We may need to consider x-ray or MRI if not getting better.      2. Pain of both elbows        - It also may be some inflammation. Recommend try heat exercises and ice.      3. Hyperthyroidism         - Continue follow up with Dr. James Dixon.    4. Coronary artery disease involving native coronary artery of native heart without angina pectoris                                               - History of myocardial infarction. Continue follow-up with Dr. Parra who is his cardiologist.        5. Essential hypertension        - Blood pressure is good at 110/68 mmHg. Continue medications.    6. Gastroesophageal reflux disease without esophagitis        - Continue omeprazole 20 mg daily.    7. Generalized anxiety disorder        - He sees a psychiatrist for that and his depression. Continue buspirone and Luvox.    8. Recurrent major depressive disorder, in full remission (HCC)        - He sees a psychiatrist for that and his depression. Continue buspirone and Luvox.    9. Arthritis of lumbar spine        - He sees Dr. Estrada for evaluation and treatment of that.    10. Need for shingles vaccine  -     Zoster Vac Recomb Adjuvanted 50 MCG/0.5ML reconstituted suspension; Inject 0.5 mL into the appropriate muscle as directed by prescriber 1 (One) Time for 1 dose.  Dispense: 1 each; Refill: 1    11. Bilateral impacted cerumen        - He has hearing aids and has significant hard wax in both ears. Recommend over the counter hydrogen peroxide drops at night. If not improving with this, he will need his ears flushed.    12. Bilateral hearing loss, unspecified hearing loss type          DISCUSSION      Follow Up   Return if symptoms worsen or fail to improve.    Patient was given instructions and counseling regarding his condition or for health maintenance advice. Please see specific information pulled into the AVS if appropriate.       Wilfredo Salvador MD

## 2022-03-16 ENCOUNTER — PATIENT ROUNDING (BHMG ONLY) (OUTPATIENT)
Dept: FAMILY MEDICINE CLINIC | Facility: CLINIC | Age: 67
End: 2022-03-16

## 2022-03-16 NOTE — PROGRESS NOTES
A My-Chart message has been sent to the patient for PATIENT ROUNDING with [facility].

## 2022-03-28 NOTE — HMH.ITSHM
Current Home Medications as stated by this patient Ramon Gordon or representative.
Cannabis/Heroin/Opiates

## 2022-06-24 ENCOUNTER — LAB (OUTPATIENT)
Dept: LAB | Facility: HOSPITAL | Age: 67
End: 2022-06-24

## 2022-06-24 ENCOUNTER — OFFICE VISIT (OUTPATIENT)
Dept: ENDOCRINOLOGY | Facility: CLINIC | Age: 67
End: 2022-06-24

## 2022-06-24 VITALS
DIASTOLIC BLOOD PRESSURE: 70 MMHG | HEART RATE: 74 BPM | BODY MASS INDEX: 25.9 KG/M2 | OXYGEN SATURATION: 95 % | HEIGHT: 71 IN | WEIGHT: 185 LBS | SYSTOLIC BLOOD PRESSURE: 111 MMHG

## 2022-06-24 DIAGNOSIS — E05.90 HYPERTHYROIDISM: Primary | ICD-10-CM

## 2022-06-24 DIAGNOSIS — E05.90 HYPERTHYROIDISM: ICD-10-CM

## 2022-06-24 PROCEDURE — 99213 OFFICE O/P EST LOW 20 MIN: CPT | Performed by: INTERNAL MEDICINE

## 2022-06-24 RX ORDER — PANTOPRAZOLE SODIUM 40 MG/1
TABLET, DELAYED RELEASE ORAL
COMMUNITY
Start: 2022-04-19

## 2022-06-24 NOTE — PROGRESS NOTES
"     Office Note      Date: 2022  Patient Name: Fam Tao  MRN: [id number]  : 1955    Chief Complaint   Patient presents with   • Hyperthyroidism       History of Present Illness:   Fam Tao is a 66 y.o. male who presents for Hyperthyroidism  I have him on 10 mg per day of mmi which he is tolerating well.  -- he notes excessive sweating and heat intolerance.  -- some heart palpitations.  Weight has been stable.  Some hand shakiness.  No ocular bulging.  No insomnia.  Not more irritable than usual    Subjective          Review of Systems:   Review of Systems   Constitutional: Positive for fatigue.   Cardiovascular: Positive for palpitations.   Endocrine: Positive for heat intolerance.   Neurological: Positive for tremors.       The following portions of the patient's history were reviewed and updated as appropriate: allergies, current medications, past family history, past medical history, past social history, past surgical history and problem list.    Objective     Visit Vitals  /70   Pulse 74   Ht 180.3 cm (71\")   Wt 83.9 kg (185 lb)   SpO2 95%   BMI 25.80 kg/m²       Labs:    CBC w/DIFF  Lab Results   Component Value Date    WBC 8.35 2017    RBC 4.73 2017    HGB 14.9 2017    HCT 43.8 2017    MCV 92.6 2017    MCH 31.5 (H) 2017    MCHC 34.0 2017    RDW 12.8 2017    RDWSD 43.5 2017    MPV 9.6 2017     2017       T4  Free T4   Date Value Ref Range Status   2022 1.52 0.82 - 1.77 ng/dL Final       TSH  No results found for: TSHBASE     Physical Exam:  Physical Exam  Vitals reviewed.   Constitutional:       Appearance: Normal appearance.   HENT:      Head: Normocephalic and atraumatic.   Eyes:      Extraocular Movements: Extraocular movements intact.   Neck:      Comments: Thyroid is slightly larger than a normal gland   Musculoskeletal:      Comments: +++ tremor   Lymphadenopathy:      Cervical: No cervical " adenopathy.   Neurological:      Mental Status: He is alert.   Psychiatric:         Mood and Affect: Mood normal.         Thought Content: Thought content normal.         Judgment: Judgment normal.         Assessment / Plan      Assessment & Plan:  Problem List Items Addressed This Visit        Other    Hyperthyroidism - Primary    Current Assessment & Plan     Slightly improved based upon clinical assessment. Will check levels and adjust dose of medication           Relevant Medications    carvedilol (COREG) 6.25 MG tablet    methIMAzole (TAPAZOLE) 10 MG tablet           James Dixon MD   06/24/2022

## 2022-06-25 LAB
T4 FREE SERPL-MCNC: 1.15 NG/DL (ref 0.93–1.7)
TSH SERPL DL<=0.005 MIU/L-ACNC: 0.52 UIU/ML (ref 0.27–4.2)

## 2022-07-29 ENCOUNTER — TELEPHONE (OUTPATIENT)
Dept: FAMILY MEDICINE CLINIC | Facility: CLINIC | Age: 67
End: 2022-07-29

## 2022-08-01 NOTE — TELEPHONE ENCOUNTER
Called patient stated he had one last year and would rather not do it right now if he don't have too. Former smoker stopped 12 years ago. Smoked a Pack and a half per day.

## 2022-08-01 NOTE — TELEPHONE ENCOUNTER
Please call, he can wait if he wants but it is recommended once per year. Just let us know when he wants to proceed.

## 2022-09-27 ENCOUNTER — TELEPHONE (OUTPATIENT)
Dept: FAMILY MEDICINE CLINIC | Facility: CLINIC | Age: 67
End: 2022-09-27

## 2022-09-27 NOTE — TELEPHONE ENCOUNTER
Caller: Fam Tao    Relationship: Self    Best call back number: [phone]    What is the best time to reach you: ANY TIME     Who are you requesting to speak with (clinical staff, provider,  specific staff member): CLINICAL      What was the call regarding: PLEASE CALL PATIENT AND LET HIM KNOW IF HE IS DUE FOR A PNEUMONIA SHOT. LAST RECEIVED IN 2020    Do you require a callback:YES

## 2022-09-27 NOTE — TELEPHONE ENCOUNTER
Please call.  He needs to get the new pneumonia shot called Prevnar 20 and then he would be done.  He should not need any more after that.

## 2022-10-03 ENCOUNTER — TELEPHONE (OUTPATIENT)
Dept: FAMILY MEDICINE CLINIC | Facility: CLINIC | Age: 67
End: 2022-10-03

## 2022-10-03 NOTE — TELEPHONE ENCOUNTER
Caller: Fam Tao    Relationship to patient: Self    Best call back number: [phone] (    What is the call regarding:  PATIENT STATES THAT HE WOULD LIKE TO GET HIS PNEUMONIA SHOT AND WANTED TO MAKE SURE IT WAS TIME FOR HIM TO GET IT.  PLEASE ADVISE.

## 2022-10-03 NOTE — TELEPHONE ENCOUNTER
Please call.  Okay to get the Prevnar 20 for pneumonia and that we will complete the series.  In addition, okay for high-dose flu shot if he has not yet had one this year.

## 2022-10-06 ENCOUNTER — FLU SHOT (OUTPATIENT)
Dept: FAMILY MEDICINE CLINIC | Facility: CLINIC | Age: 67
End: 2022-10-06

## 2022-10-06 ENCOUNTER — CLINICAL SUPPORT (OUTPATIENT)
Dept: FAMILY MEDICINE CLINIC | Facility: CLINIC | Age: 67
End: 2022-10-06

## 2022-10-06 DIAGNOSIS — Z23 NEED FOR PNEUMOCOCCAL VACCINATION: Primary | ICD-10-CM

## 2022-10-06 DIAGNOSIS — Z23 NEED FOR INFLUENZA VACCINATION: Primary | ICD-10-CM

## 2022-10-06 PROCEDURE — 90662 IIV NO PRSV INCREASED AG IM: CPT | Performed by: FAMILY MEDICINE

## 2022-10-06 PROCEDURE — G0008 ADMIN INFLUENZA VIRUS VAC: HCPCS | Performed by: FAMILY MEDICINE

## 2022-10-06 PROCEDURE — G0009 ADMIN PNEUMOCOCCAL VACCINE: HCPCS | Performed by: FAMILY MEDICINE

## 2022-10-06 PROCEDURE — 90677 PCV20 VACCINE IM: CPT | Performed by: FAMILY MEDICINE

## 2022-10-12 ENCOUNTER — OFFICE VISIT (OUTPATIENT)
Dept: ENDOCRINOLOGY | Facility: CLINIC | Age: 67
End: 2022-10-12

## 2022-10-12 VITALS
BODY MASS INDEX: 26.32 KG/M2 | DIASTOLIC BLOOD PRESSURE: 69 MMHG | HEART RATE: 85 BPM | WEIGHT: 188 LBS | HEIGHT: 71 IN | SYSTOLIC BLOOD PRESSURE: 110 MMHG | OXYGEN SATURATION: 95 %

## 2022-10-12 DIAGNOSIS — E05.90 HYPERTHYROIDISM: Primary | ICD-10-CM

## 2022-10-12 PROCEDURE — 99213 OFFICE O/P EST LOW 20 MIN: CPT | Performed by: INTERNAL MEDICINE

## 2022-10-12 NOTE — PROGRESS NOTES
"     Office Note      Date: 10/12/2022  Patient Name: Fam Tao  MRN: [id number]  : 1955    Chief Complaint   Patient presents with   • Hyperthyroidism       History of Present Illness:   Fam Tao is a 67 y.o. male who presents for Hyperthyroidism  on chronic management with methimazole 10 mg per day  There has been no change to his medical history.  ----      Subjective          Review of Systems:   Review of Systems   Constitutional: Negative.  Negative for fatigue and unexpected weight change.   HENT: Negative.  Negative for trouble swallowing and voice change.    Eyes: Negative.    Cardiovascular: Positive for palpitations.   Gastrointestinal: Negative.    Endocrine: Negative for cold intolerance and heat intolerance.   Neurological: Positive for tremors.   Psychiatric/Behavioral: Positive for sleep disturbance. The patient is nervous/anxious.        The following portions of the patient's history were reviewed and updated as appropriate: allergies, current medications, past family history, past medical history, past social history, past surgical history and problem list.    Objective     Visit Vitals  /69   Pulse 85   Ht 180.3 cm (71\")   Wt 85.3 kg (188 lb)   SpO2 95%   BMI 26.22 kg/m²       Labs:    CBC w/DIFF  Lab Results   Component Value Date    WBC 8.35 2017    RBC 4.73 2017    HGB 14.9 2017    HCT 43.8 2017    MCV 92.6 2017    MCH 31.5 (H) 2017    MCHC 34.0 2017    RDW 12.8 2017    RDWSD 43.5 2017    MPV 9.6 2017     2017       T4  Free T4   Date Value Ref Range Status   2022 1.15 0.93 - 1.70 ng/dL Final     Comment:     Results may be falsely increased if patient taking Biotin.       TSH  No results found for: TSHBASE     Physical Exam:  Physical Exam  Vitals reviewed.   Constitutional:       Appearance: Normal appearance.   HENT:      Head: Normocephalic and atraumatic.   Eyes:      Extraocular " Movements: Extraocular movements intact.   Neck:      Comments: Thyroid is smaller  Musculoskeletal:      Comments: tremor   Lymphadenopathy:      Cervical: No cervical adenopathy.   Skin:     Comments: Dystrophic nails   Neurological:      Mental Status: He is alert.         Assessment / Plan      Assessment & Plan:  Problem List Items Addressed This Visit        Other    Hyperthyroidism - Primary    Current Assessment & Plan     Stable  Clinically euthyroid. Thyroid levels ordered. Medication to be adjusted accordingly.         Relevant Medications    carvedilol (COREG) 6.25 MG tablet    methIMAzole (TAPAZOLE) 10 MG tablet    Other Relevant Orders    TSH    T4, Free        James Dixon MD   10/12/2022

## 2022-10-13 LAB
T4 FREE SERPL-MCNC: 0.9 NG/DL (ref 0.93–1.7)
TSH SERPL DL<=0.005 MIU/L-ACNC: 2.46 UIU/ML (ref 0.27–4.2)

## 2022-10-21 ENCOUNTER — OFFICE VISIT (OUTPATIENT)
Dept: FAMILY MEDICINE CLINIC | Facility: CLINIC | Age: 67
End: 2022-10-21

## 2022-10-21 VITALS
HEART RATE: 78 BPM | DIASTOLIC BLOOD PRESSURE: 70 MMHG | HEIGHT: 71 IN | RESPIRATION RATE: 18 BRPM | OXYGEN SATURATION: 98 % | WEIGHT: 188 LBS | SYSTOLIC BLOOD PRESSURE: 122 MMHG | BODY MASS INDEX: 26.32 KG/M2 | TEMPERATURE: 97.5 F

## 2022-10-21 DIAGNOSIS — E78.00 HYPERCHOLESTEREMIA: ICD-10-CM

## 2022-10-21 DIAGNOSIS — H81.13 BENIGN PAROXYSMAL POSITIONAL VERTIGO DUE TO BILATERAL VESTIBULAR DISORDER: Primary | ICD-10-CM

## 2022-10-21 PROCEDURE — 99214 OFFICE O/P EST MOD 30 MIN: CPT | Performed by: FAMILY MEDICINE

## 2022-10-21 RX ORDER — MECLIZINE HYDROCHLORIDE 25 MG/1
TABLET ORAL
Qty: 40 TABLET | Refills: 1 | Status: SHIPPED | OUTPATIENT
Start: 2022-10-21

## 2022-10-21 NOTE — PROGRESS NOTES
Subjective   Fam Tao is a 67 y.o. male.     History of Present Illness     Woke up this AM and just felt like the room was spinning  Worse if he looks down and moves his head around  He uses a cane to walk all the time, so this is not new  No HA, no vision changes  Can have some nausea with the waves of vertigo    He is on medicine for cholesterol  He has been taking repatha and is due for labs  No SE noted for medicine  Cardiologist wanted this checked    The following portions of the patient's history were reviewed and updated as appropriate: allergies, current medications, past family history, past medical history, past social history, past surgical history and problem list.    Review of Systems   Constitutional: Negative.    Neurological: Positive for dizziness.       Objective   Physical Exam  Vitals and nursing note reviewed.   Constitutional:       Appearance: He is well-developed.   HENT:      Head: Normocephalic and atraumatic.      Right Ear: Hearing, tympanic membrane, ear canal and external ear normal.      Left Ear: Hearing, tympanic membrane, ear canal and external ear normal.      Nose: Nose normal.      Mouth/Throat:      Pharynx: Uvula midline.   Eyes:      Extraocular Movements: Extraocular movements intact.      Conjunctiva/sclera: Conjunctivae normal.      Pupils: Pupils are equal, round, and reactive to light.      Comments: No nystagmus noted   Cardiovascular:      Rate and Rhythm: Normal rate and regular rhythm.      Heart sounds: Normal heart sounds.   Pulmonary:      Effort: Pulmonary effort is normal.      Breath sounds: Normal breath sounds.   Musculoskeletal:      Cervical back: Normal range of motion.   Lymphadenopathy:      Cervical: No cervical adenopathy.   Psychiatric:         Behavior: Behavior normal.         Assessment & Plan   Diagnoses and all orders for this visit:    1. Benign paroxysmal positional vertigo due to bilateral vestibular disorder (Primary)  -     meclizine  (ANTIVERT) 25 MG tablet; 1/2 to 1 pill as needed every 4 hours for dizziness  Dispense: 40 tablet; Refill: 1    2. Hypercholesteremia  -     CBC & Differential  -     Comprehensive Metabolic Panel  -     Lipid Panel    meclizine and PRN epley maneuvers for the BPV.  Pt to call back INB    Will recheck lipids and plan to continue repatha pending results.  Will forward results to cardiologist as well as review at my office

## 2022-10-22 LAB
ALBUMIN SERPL-MCNC: 4.7 G/DL (ref 3.8–4.8)
ALBUMIN/GLOB SERPL: 2 {RATIO} (ref 1.2–2.2)
ALP SERPL-CCNC: 154 IU/L (ref 44–121)
ALT SERPL-CCNC: 30 IU/L (ref 0–44)
AST SERPL-CCNC: 28 IU/L (ref 0–40)
BASOPHILS # BLD AUTO: 0.1 X10E3/UL (ref 0–0.2)
BASOPHILS NFR BLD AUTO: 1 %
BILIRUB SERPL-MCNC: 0.4 MG/DL (ref 0–1.2)
BUN SERPL-MCNC: 24 MG/DL (ref 8–27)
BUN/CREAT SERPL: 16 (ref 10–24)
CALCIUM SERPL-MCNC: 9.3 MG/DL (ref 8.6–10.2)
CHLORIDE SERPL-SCNC: 102 MMOL/L (ref 96–106)
CHOLEST SERPL-MCNC: 112 MG/DL (ref 100–199)
CO2 SERPL-SCNC: 24 MMOL/L (ref 20–29)
CREAT SERPL-MCNC: 1.5 MG/DL (ref 0.76–1.27)
EGFRCR SERPLBLD CKD-EPI 2021: 51 ML/MIN/1.73
EOSINOPHIL # BLD AUTO: 0.2 X10E3/UL (ref 0–0.4)
EOSINOPHIL NFR BLD AUTO: 2 %
ERYTHROCYTE [DISTWIDTH] IN BLOOD BY AUTOMATED COUNT: 12.7 % (ref 11.6–15.4)
GLOBULIN SER CALC-MCNC: 2.4 G/DL (ref 1.5–4.5)
GLUCOSE SERPL-MCNC: 111 MG/DL (ref 70–99)
HCT VFR BLD AUTO: 43.5 % (ref 37.5–51)
HDLC SERPL-MCNC: 62 MG/DL
HGB BLD-MCNC: 14.6 G/DL (ref 13–17.7)
IMM GRANULOCYTES # BLD AUTO: 0 X10E3/UL (ref 0–0.1)
IMM GRANULOCYTES NFR BLD AUTO: 1 %
LDLC SERPL CALC-MCNC: 35 MG/DL (ref 0–99)
LYMPHOCYTES # BLD AUTO: 1.7 X10E3/UL (ref 0.7–3.1)
LYMPHOCYTES NFR BLD AUTO: 20 %
MCH RBC QN AUTO: 30.7 PG (ref 26.6–33)
MCHC RBC AUTO-ENTMCNC: 33.6 G/DL (ref 31.5–35.7)
MCV RBC AUTO: 92 FL (ref 79–97)
MONOCYTES # BLD AUTO: 0.6 X10E3/UL (ref 0.1–0.9)
MONOCYTES NFR BLD AUTO: 7 %
NEUTROPHILS # BLD AUTO: 5.8 X10E3/UL (ref 1.4–7)
NEUTROPHILS NFR BLD AUTO: 69 %
PLATELET # BLD AUTO: 287 X10E3/UL (ref 150–450)
POTASSIUM SERPL-SCNC: 5.2 MMOL/L (ref 3.5–5.2)
PROT SERPL-MCNC: 7.1 G/DL (ref 6–8.5)
RBC # BLD AUTO: 4.75 X10E6/UL (ref 4.14–5.8)
SODIUM SERPL-SCNC: 141 MMOL/L (ref 134–144)
TRIGL SERPL-MCNC: 74 MG/DL (ref 0–149)
VLDLC SERPL CALC-MCNC: 15 MG/DL (ref 5–40)
WBC # BLD AUTO: 8.3 X10E3/UL (ref 3.4–10.8)

## 2022-11-21 ENCOUNTER — TELEPHONE (OUTPATIENT)
Dept: FAMILY MEDICINE CLINIC | Facility: CLINIC | Age: 67
End: 2022-11-21

## 2022-11-21 NOTE — TELEPHONE ENCOUNTER
It is recommended to get a the COVID booster.  I do recommend it.  If they did not have any side effects with the [employer], then okay to proceed with the new COVID booster that covers the old COVID virus and a current strain of COVID virus.    Of course there are risk of side effects with any of the COVID vaccines including fever, aches, chills etc.

## 2022-11-21 NOTE — TELEPHONE ENCOUNTER
Caller: Fam Tao    Relationship to patient: Self    Best call back number: [phone]    Additional information or concerns:     SHOULD PATIENT AND WIFE GET A COVID BOOSTER?  THEY BOTH GOT THE LIYAH [employer] LIYAH

## 2023-03-01 ENCOUNTER — OFFICE VISIT (OUTPATIENT)
Dept: FAMILY MEDICINE CLINIC | Facility: CLINIC | Age: 68
End: 2023-03-01
Payer: MEDICARE

## 2023-03-01 ENCOUNTER — HOSPITAL ENCOUNTER (OUTPATIENT)
Dept: GENERAL RADIOLOGY | Facility: HOSPITAL | Age: 68
Discharge: HOME | End: 2023-03-01
Admitting: FAMILY MEDICINE
Payer: MEDICARE

## 2023-03-01 VITALS
DIASTOLIC BLOOD PRESSURE: 58 MMHG | OXYGEN SATURATION: 96 % | WEIGHT: 190 LBS | BODY MASS INDEX: 26.6 KG/M2 | HEART RATE: 74 BPM | TEMPERATURE: 97.8 F | SYSTOLIC BLOOD PRESSURE: 112 MMHG | RESPIRATION RATE: 18 BRPM | HEIGHT: 71 IN

## 2023-03-01 DIAGNOSIS — M75.41 IMPINGEMENT SYNDROME OF RIGHT SHOULDER: Primary | ICD-10-CM

## 2023-03-01 DIAGNOSIS — M79.642 LEFT HAND PAIN: ICD-10-CM

## 2023-03-01 DIAGNOSIS — M75.41 IMPINGEMENT SYNDROME OF RIGHT SHOULDER: ICD-10-CM

## 2023-03-01 DIAGNOSIS — M25.522 LEFT ELBOW PAIN: ICD-10-CM

## 2023-03-01 PROCEDURE — 73130 X-RAY EXAM OF HAND: CPT

## 2023-03-01 PROCEDURE — 99214 OFFICE O/P EST MOD 30 MIN: CPT | Performed by: FAMILY MEDICINE

## 2023-03-01 PROCEDURE — 73030 X-RAY EXAM OF SHOULDER: CPT

## 2023-03-01 RX ORDER — PREDNISONE 20 MG/1
40 TABLET ORAL DAILY
Qty: 10 TABLET | Refills: 0 | Status: SHIPPED | OUTPATIENT
Start: 2023-03-01

## 2023-03-01 NOTE — PROGRESS NOTES
Subjective   Fam Tao is a 67 y.o. male.     History of Present Illness     He has pain in his left hand, left elbow and right shoulder    This pain has been ongoing for about one month  No known injury    It hurts to grab or manipulate things with hie left hand  No swelling, no trauma noted      Right shoulder just hurts to sleep, hurts to move it in any direction  Again, no injury, just pain that has slowly been getting worse and worse      Review of Systems   Constitutional: Negative.    Musculoskeletal: Positive for arthralgias.       Objective   Physical Exam  Vitals and nursing note reviewed.   Constitutional:       General: He is not in acute distress.     Appearance: Normal appearance. He is well-developed.   Cardiovascular:      Rate and Rhythm: Normal rate and regular rhythm.      Heart sounds: Normal heart sounds.   Pulmonary:      Effort: Pulmonary effort is normal.      Breath sounds: Normal breath sounds.   Musculoskeletal:        Arms:         Hands:    Neurological:      Mental Status: He is alert and oriented to person, place, and time.   Psychiatric:         Mood and Affect: Mood normal.         Behavior: Behavior normal.         Thought Content: Thought content normal.         Judgment: Judgment normal.         Assessment & Plan   Diagnoses and all orders for this visit:    1. Impingement syndrome of right shoulder (Primary)  -     predniSONE (DELTASONE) 20 MG tablet; Take 2 tablets by mouth Daily.  Dispense: 10 tablet; Refill: 0  -     XR Shoulder 2+ View Right; Future    2. Left hand pain  -     predniSONE (DELTASONE) 20 MG tablet; Take 2 tablets by mouth Daily.  Dispense: 10 tablet; Refill: 0  -     XR Hand 3+ View Left; Future    3. Left elbow pain    XR of shoulder and treat with pred burst.  Likely will recommend PT pending XR but discussed we may need ortho if PT and pred does not help.  Will call him with results of XR.    XR hand and pred. Tylenol arthritis in future.  May need  hand injection and just as above, pt will call back INB    Elbow is related to pain with use of hand, mild lateral epicondylitis suspected but will work on hand first.        Pt needs medicare wellness, will schedule with PCP

## 2023-03-02 DIAGNOSIS — M75.41 IMPINGEMENT SYNDROME OF RIGHT SHOULDER: Primary | ICD-10-CM

## 2023-03-13 RX ORDER — METHIMAZOLE 10 MG/1
10 TABLET ORAL DAILY
Qty: 90 TABLET | Refills: 3 | Status: SHIPPED | OUTPATIENT
Start: 2023-03-13 | End: 2024-03-12

## 2023-04-18 ENCOUNTER — OFFICE VISIT (OUTPATIENT)
Dept: ENDOCRINOLOGY | Facility: CLINIC | Age: 68
End: 2023-04-18
Payer: MEDICARE

## 2023-04-18 VITALS
DIASTOLIC BLOOD PRESSURE: 52 MMHG | WEIGHT: 190.6 LBS | HEIGHT: 71 IN | OXYGEN SATURATION: 97 % | RESPIRATION RATE: 21 BRPM | HEART RATE: 75 BPM | BODY MASS INDEX: 26.68 KG/M2 | TEMPERATURE: 96.6 F | SYSTOLIC BLOOD PRESSURE: 118 MMHG

## 2023-04-18 DIAGNOSIS — E05.90 HYPERTHYROIDISM: Primary | ICD-10-CM

## 2023-04-18 PROCEDURE — 3078F DIAST BP <80 MM HG: CPT | Performed by: INTERNAL MEDICINE

## 2023-04-18 PROCEDURE — 1160F RVW MEDS BY RX/DR IN RCRD: CPT | Performed by: INTERNAL MEDICINE

## 2023-04-18 PROCEDURE — 36415 COLL VENOUS BLD VENIPUNCTURE: CPT | Performed by: INTERNAL MEDICINE

## 2023-04-18 PROCEDURE — 1159F MED LIST DOCD IN RCRD: CPT | Performed by: INTERNAL MEDICINE

## 2023-04-18 PROCEDURE — 99213 OFFICE O/P EST LOW 20 MIN: CPT | Performed by: INTERNAL MEDICINE

## 2023-04-18 PROCEDURE — 3074F SYST BP LT 130 MM HG: CPT | Performed by: INTERNAL MEDICINE

## 2023-04-18 NOTE — PROGRESS NOTES
"     Office Note      Date: 2023  Patient Name: Fam Tao  MRN: [id number]  : 1955    Chief Complaint   Patient presents with   • Hyperthyroidism       History of Present Illness:   Fam Tao is a 67 y.o. male who presents for Hyperthyroidism  .   Current rx: methimazole 10 mg per day     Changes in history: none   Questions /problems: none     Subjective          Review of Systems:   Review of Systems   Constitutional: Negative for fatigue and unexpected weight change.   HENT: Negative for trouble swallowing and voice change.    Cardiovascular: Negative for palpitations.   Endocrine: Negative for cold intolerance and heat intolerance.   Neurological: Positive for tremors. Negative for syncope and weakness.   Psychiatric/Behavioral: Negative for dysphoric mood and sleep disturbance. The patient is not nervous/anxious.        The following portions of the patient's history were reviewed and updated as appropriate: allergies, current medications, past family history, past medical history, past social history, past surgical history and problem list.    Objective     Visit Vitals  /52   Pulse 75   Temp 96.6 °F (35.9 °C) (Infrared)   Resp 21   Ht 180.3 cm (70.98\")   Wt 86.5 kg (190 lb 9.6 oz)   SpO2 97%   BMI 26.60 kg/m²           Physical Exam:  Physical Exam  Vitals reviewed.   Constitutional:       Appearance: Normal appearance.   Eyes:      Extraocular Movements: Extraocular movements intact.   Neck:      Comments: Slightly enlarged thyroid   Cardiovascular:      Rate and Rhythm: Normal rate.   Musculoskeletal:      Comments: Coarse tremor   Lymphadenopathy:      Cervical: No cervical adenopathy.   Neurological:      Mental Status: He is alert.   Psychiatric:         Mood and Affect: Mood normal.         Behavior: Behavior normal.         Thought Content: Thought content normal.         Judgment: Judgment normal.         Assessment / Plan      Assessment & Plan:  Problem List Items " Addressed This Visit        Other    Hyperthyroidism - Primary    Current Assessment & Plan     Stable  Clinically euthyroid. Thyroid levels ordered. Medication to be adjusted accordingly.         Relevant Medications    carvedilol (COREG) 6.25 MG tablet    methIMAzole (TAPAZOLE) 10 MG tablet    Other Relevant Orders    TSH    T4, Free    Comprehensive Metabolic Panel    CBC (No Diff)        James Dixon MD   04/18/2023

## 2023-04-19 ENCOUNTER — TELEPHONE (OUTPATIENT)
Dept: ENDOCRINOLOGY | Facility: CLINIC | Age: 68
End: 2023-04-19
Payer: MEDICARE

## 2023-04-19 DIAGNOSIS — E87.5 HYPERKALEMIA: Primary | ICD-10-CM

## 2023-04-19 LAB
ALBUMIN SERPL-MCNC: 4.5 G/DL (ref 3.5–5.2)
ALBUMIN/GLOB SERPL: 2.3 G/DL
ALP SERPL-CCNC: 140 U/L (ref 39–117)
ALT SERPL-CCNC: 27 U/L (ref 1–41)
AST SERPL-CCNC: 21 U/L (ref 1–40)
BILIRUB SERPL-MCNC: <0.2 MG/DL (ref 0–1.2)
BUN SERPL-MCNC: 20 MG/DL (ref 8–23)
BUN/CREAT SERPL: 12.9 (ref 7–25)
CALCIUM SERPL-MCNC: 9.1 MG/DL (ref 8.6–10.5)
CHLORIDE SERPL-SCNC: 104 MMOL/L (ref 98–107)
CO2 SERPL-SCNC: 23.4 MMOL/L (ref 22–29)
CREAT SERPL-MCNC: 1.55 MG/DL (ref 0.76–1.27)
EGFRCR SERPLBLD CKD-EPI 2021: 49.1 ML/MIN/1.73
ERYTHROCYTE [DISTWIDTH] IN BLOOD BY AUTOMATED COUNT: 12.5 % (ref 12.3–15.4)
GLOBULIN SER CALC-MCNC: 2 GM/DL
GLUCOSE SERPL-MCNC: 124 MG/DL (ref 65–99)
HCT VFR BLD AUTO: 43.4 % (ref 37.5–51)
HGB BLD-MCNC: 14.8 G/DL (ref 13–17.7)
MCH RBC QN AUTO: 31.2 PG (ref 26.6–33)
MCHC RBC AUTO-ENTMCNC: 34.1 G/DL (ref 31.5–35.7)
MCV RBC AUTO: 91.6 FL (ref 79–97)
PLATELET # BLD AUTO: 229 10*3/MM3 (ref 140–450)
POTASSIUM SERPL-SCNC: 6.9 MMOL/L (ref 3.5–5.2)
PROT SERPL-MCNC: 6.5 G/DL (ref 6–8.5)
RBC # BLD AUTO: 4.74 10*6/MM3 (ref 4.14–5.8)
SODIUM SERPL-SCNC: 139 MMOL/L (ref 136–145)
T4 FREE SERPL-MCNC: 1.17 NG/DL (ref 0.93–1.7)
TSH SERPL DL<=0.005 MIU/L-ACNC: 1.2 UIU/ML (ref 0.27–4.2)
WBC # BLD AUTO: 8.69 10*3/MM3 (ref 3.4–10.8)

## 2023-04-20 ENCOUNTER — LAB (OUTPATIENT)
Dept: LAB | Facility: HOSPITAL | Age: 68
End: 2023-04-20
Payer: MEDICARE

## 2023-04-20 DIAGNOSIS — E87.5 HYPERKALEMIA: ICD-10-CM

## 2023-04-20 DIAGNOSIS — E05.90 PRIMARY HYPERTHYROIDISM: Primary | ICD-10-CM

## 2023-04-20 LAB
ANION GAP SERPL CALCULATED.3IONS-SCNC: 11 MMOL/L (ref 5–15)
BUN SERPL-MCNC: 21 MG/DL (ref 8–23)
BUN/CREAT SERPL: 13.7 (ref 7–25)
CALCIUM SPEC-SCNC: 9.4 MG/DL (ref 8.6–10.5)
CHLORIDE SERPL-SCNC: 105 MMOL/L (ref 98–107)
CO2 SERPL-SCNC: 25 MMOL/L (ref 22–29)
CREAT SERPL-MCNC: 1.53 MG/DL (ref 0.76–1.27)
EGFRCR SERPLBLD CKD-EPI 2021: 49.8 ML/MIN/1.73
GLUCOSE SERPL-MCNC: 107 MG/DL (ref 65–99)
POTASSIUM SERPL-SCNC: 4.9 MMOL/L (ref 3.5–5.2)
SODIUM SERPL-SCNC: 141 MMOL/L (ref 136–145)

## 2023-04-20 PROCEDURE — 36415 COLL VENOUS BLD VENIPUNCTURE: CPT | Performed by: INTERNAL MEDICINE

## 2023-04-20 PROCEDURE — 80048 BASIC METABOLIC PNL TOTAL CA: CPT

## 2023-04-20 PROCEDURE — 85027 COMPLETE CBC AUTOMATED: CPT

## 2023-04-20 NOTE — TELEPHONE ENCOUNTER
I have received a phone call from the lab with critical potassium of 6.9. (There were 3 patients with elevated potassium reported, which raises the question of the reliability of the lab value). I have attempted to reach the patient over the phone and notify of the findings, but was not able to reach him. I have left a message to call the office in the morning and come for repeat potassium.  I have asked him to go to the ER if he has any sx of muscle weakness or palpitations. He may also need to hold lisinopril and repeat labs.   I have ordered repeat potassium level for tomorrow stat.

## 2023-04-20 NOTE — TELEPHONE ENCOUNTER
PT wanted to let Raleigh Chandler and Destiny know he will be going to his PCP for lab work this morning. They are [facility].    Type 2 diabetes mellitus

## 2023-04-21 ENCOUNTER — TELEPHONE (OUTPATIENT)
Dept: ENDOCRINOLOGY | Facility: CLINIC | Age: 68
End: 2023-04-21
Payer: MEDICARE

## 2023-04-21 LAB
DEPRECATED RDW RBC AUTO: 42.8 FL (ref 37–54)
ERYTHROCYTE [DISTWIDTH] IN BLOOD BY AUTOMATED COUNT: 12.5 % (ref 12.3–15.4)
HCT VFR BLD AUTO: 43.9 % (ref 37.5–51)
HGB BLD-MCNC: 14.9 G/DL (ref 13–17.7)
MCH RBC QN AUTO: 31.3 PG (ref 26.6–33)
MCHC RBC AUTO-ENTMCNC: 33.9 G/DL (ref 31.5–35.7)
MCV RBC AUTO: 92.2 FL (ref 79–97)
PLATELET # BLD AUTO: 238 10*3/MM3 (ref 140–450)
PMV BLD AUTO: 10.4 FL (ref 6–12)
RBC # BLD AUTO: 4.76 10*6/MM3 (ref 4.14–5.8)
WBC NRBC COR # BLD: 9.4 10*3/MM3 (ref 3.4–10.8)

## 2023-05-03 ENCOUNTER — OFFICE VISIT (OUTPATIENT)
Dept: FAMILY MEDICINE CLINIC | Facility: CLINIC | Age: 68
End: 2023-05-03
Payer: MEDICARE

## 2023-05-03 VITALS
WEIGHT: 192 LBS | SYSTOLIC BLOOD PRESSURE: 118 MMHG | HEIGHT: 71 IN | DIASTOLIC BLOOD PRESSURE: 62 MMHG | BODY MASS INDEX: 26.88 KG/M2 | RESPIRATION RATE: 18 BRPM | HEART RATE: 76 BPM | TEMPERATURE: 98 F

## 2023-05-03 DIAGNOSIS — L23.9 ALLERGIC CONTACT DERMATITIS, UNSPECIFIED TRIGGER: Primary | ICD-10-CM

## 2023-05-03 PROCEDURE — 3074F SYST BP LT 130 MM HG: CPT | Performed by: FAMILY MEDICINE

## 2023-05-03 PROCEDURE — 1160F RVW MEDS BY RX/DR IN RCRD: CPT | Performed by: FAMILY MEDICINE

## 2023-05-03 PROCEDURE — 3078F DIAST BP <80 MM HG: CPT | Performed by: FAMILY MEDICINE

## 2023-05-03 PROCEDURE — 99213 OFFICE O/P EST LOW 20 MIN: CPT | Performed by: FAMILY MEDICINE

## 2023-05-03 PROCEDURE — 1159F MED LIST DOCD IN RCRD: CPT | Performed by: FAMILY MEDICINE

## 2023-05-03 RX ORDER — PREDNISONE 20 MG/1
TABLET ORAL
Qty: 16 TABLET | Refills: 0 | Status: SHIPPED | OUTPATIENT
Start: 2023-05-03

## 2023-05-03 NOTE — PROGRESS NOTES
Subjective   Fam Tao is a 67 y.o. male.     History of Present Illness     Pruritic rash that started on his R anterior skin about 10 days ago  It has spread to low back and then his left upper thigh  The itching is severe  The spread has been up his body  No known exposure        Review of Systems   Skin: Positive for rash.       Objective   Physical Exam  Vitals and nursing note reviewed.   Constitutional:       General: He is not in acute distress.     Appearance: Normal appearance. He is well-developed.   Cardiovascular:      Rate and Rhythm: Normal rate and regular rhythm.      Heart sounds: Normal heart sounds.   Pulmonary:      Effort: Pulmonary effort is normal.      Breath sounds: Normal breath sounds.   Skin:         Neurological:      Mental Status: He is alert and oriented to person, place, and time.   Psychiatric:         Mood and Affect: Mood normal.         Behavior: Behavior normal.         Thought Content: Thought content normal.         Judgment: Judgment normal.         Assessment & Plan   Diagnoses and all orders for this visit:    1. Allergic contact dermatitis, unspecified trigger (Primary)  -     predniSONE (DELTASONE) 20 MG tablet; 3 po daily 2 days then 2 po daily 3 days then 1 po daily 3 days then 1/2 po daily 2 days  Dispense: 16 tablet; Refill: 0    I do think this is contact derm, will use pred taper and pt to call back INB

## 2023-05-23 ENCOUNTER — HOSPITAL ENCOUNTER (OUTPATIENT)
Age: 68
End: 2023-05-23
Payer: MEDICARE

## 2023-05-23 DIAGNOSIS — N18.2: ICD-10-CM

## 2023-05-23 DIAGNOSIS — I10: ICD-10-CM

## 2023-05-23 DIAGNOSIS — I25.10: ICD-10-CM

## 2023-05-23 DIAGNOSIS — R07.9: ICD-10-CM

## 2023-05-23 DIAGNOSIS — E78.49: ICD-10-CM

## 2023-05-23 PROCEDURE — 93306 TTE W/DOPPLER COMPLETE: CPT

## 2023-05-23 PROCEDURE — A9502 TC99M TETROFOSMIN: HCPCS

## 2023-05-23 PROCEDURE — 93017 CV STRESS TEST TRACING ONLY: CPT

## 2023-05-23 PROCEDURE — 78452 HT MUSCLE IMAGE SPECT MULT: CPT

## 2023-06-06 ENCOUNTER — HOSPITAL ENCOUNTER (OUTPATIENT)
Age: 68
Discharge: HOME | End: 2023-06-06
Payer: MEDICARE

## 2023-06-06 VITALS — BODY MASS INDEX: 26.6 KG/M2

## 2023-06-06 VITALS
TEMPERATURE: 98.42 F | SYSTOLIC BLOOD PRESSURE: 138 MMHG | DIASTOLIC BLOOD PRESSURE: 77 MMHG | OXYGEN SATURATION: 94 % | HEART RATE: 77 BPM | RESPIRATION RATE: 18 BRPM

## 2023-06-06 VITALS
OXYGEN SATURATION: 91 % | HEART RATE: 80 BPM | SYSTOLIC BLOOD PRESSURE: 95 MMHG | RESPIRATION RATE: 18 BRPM | DIASTOLIC BLOOD PRESSURE: 49 MMHG

## 2023-06-06 VITALS
HEART RATE: 75 BPM | DIASTOLIC BLOOD PRESSURE: 63 MMHG | RESPIRATION RATE: 18 BRPM | OXYGEN SATURATION: 94 % | SYSTOLIC BLOOD PRESSURE: 104 MMHG

## 2023-06-06 VITALS
SYSTOLIC BLOOD PRESSURE: 104 MMHG | DIASTOLIC BLOOD PRESSURE: 57 MMHG | OXYGEN SATURATION: 97 % | HEART RATE: 90 BPM | RESPIRATION RATE: 17 BRPM

## 2023-06-06 VITALS — HEART RATE: 87 BPM

## 2023-06-06 VITALS
OXYGEN SATURATION: 94 % | SYSTOLIC BLOOD PRESSURE: 101 MMHG | DIASTOLIC BLOOD PRESSURE: 63 MMHG | RESPIRATION RATE: 18 BRPM | HEART RATE: 79 BPM

## 2023-06-06 VITALS
SYSTOLIC BLOOD PRESSURE: 107 MMHG | RESPIRATION RATE: 18 BRPM | HEART RATE: 92 BPM | OXYGEN SATURATION: 94 % | DIASTOLIC BLOOD PRESSURE: 56 MMHG

## 2023-06-06 VITALS
SYSTOLIC BLOOD PRESSURE: 102 MMHG | HEART RATE: 76 BPM | DIASTOLIC BLOOD PRESSURE: 54 MMHG | RESPIRATION RATE: 18 BRPM | OXYGEN SATURATION: 92 %

## 2023-06-06 VITALS
SYSTOLIC BLOOD PRESSURE: 97 MMHG | RESPIRATION RATE: 16 BRPM | HEART RATE: 88 BPM | OXYGEN SATURATION: 93 % | DIASTOLIC BLOOD PRESSURE: 40 MMHG

## 2023-06-06 VITALS
RESPIRATION RATE: 18 BRPM | HEART RATE: 85 BPM | OXYGEN SATURATION: 93 % | DIASTOLIC BLOOD PRESSURE: 53 MMHG | SYSTOLIC BLOOD PRESSURE: 103 MMHG

## 2023-06-06 DIAGNOSIS — I12.9: ICD-10-CM

## 2023-06-06 DIAGNOSIS — Z95.5: ICD-10-CM

## 2023-06-06 DIAGNOSIS — N18.2: ICD-10-CM

## 2023-06-06 DIAGNOSIS — R94.30: ICD-10-CM

## 2023-06-06 DIAGNOSIS — I25.118: Primary | ICD-10-CM

## 2023-06-06 LAB
ANION GAP SERPL CALC-SCNC: 15.5 MEQ/L (ref 5–15)
BUN SERPL-MCNC: 23 MG/DL (ref 9–20)
CALCIUM SPEC-MCNC: 9.4 MG/DL (ref 8.4–10.2)
CHLORIDE SPEC-SCNC: 102 MMOL/L (ref 98–107)
CO2 SERPL-SCNC: 27 MMOL/L (ref 22–30)
CREAT BLD-SCNC: 1.7 MG/DL (ref 0.66–1.25)
CREATININE CLEARANCE ESTIMATED: 52 ML/MIN (ref 50–200)
ESTIMATED GLOMERULAR FILT RATE: 41 ML/MIN (ref 60–?)
GFR (AFRICAN AMERICAN): 49 ML/MIN (ref 60–?)
GLUCOSE: 125 MG/DL (ref 74–100)
HCT VFR BLD CALC: 45.8 % (ref 42–52)
HGB BLD-MCNC: 15 G/DL (ref 14.1–18)
MCHC RBC-ENTMCNC: 32.8 G/DL (ref 31.8–35.4)
MCV RBC: 93.2 FL (ref 80–94)
MEAN CORPUSCULAR HEMOGLOBIN: 30.5 PG (ref 27–31.2)
PLATELET # BLD: 297 K/MM3 (ref 142–424)
POTASSIUM: 4.5 MMOL/L (ref 3.5–5.1)
RBC # BLD AUTO: 4.92 M/MM3 (ref 4.6–6.2)
SODIUM SPEC-SCNC: 140 MMOL/L (ref 136–145)
WBC # BLD AUTO: 9.3 K/MM3 (ref 4.8–10.8)

## 2023-06-06 PROCEDURE — 85025 COMPLETE CBC W/AUTO DIFF WBC: CPT

## 2023-06-06 PROCEDURE — 99152 MOD SED SAME PHYS/QHP 5/>YRS: CPT

## 2023-06-06 PROCEDURE — 93458 L HRT ARTERY/VENTRICLE ANGIO: CPT

## 2023-06-06 PROCEDURE — C1769 GUIDE WIRE: HCPCS

## 2023-06-06 PROCEDURE — C1725 CATH, TRANSLUMIN NON-LASER: HCPCS

## 2023-06-06 PROCEDURE — 80048 BASIC METABOLIC PNL TOTAL CA: CPT

## 2023-06-29 PROBLEM — B35.4 TINEA CORPORIS: Status: ACTIVE | Noted: 2023-06-29

## 2023-07-21 ENCOUNTER — HOSPITAL ENCOUNTER (OUTPATIENT)
Age: 68
End: 2023-07-21
Payer: MEDICARE

## 2023-07-21 DIAGNOSIS — G47.33: Primary | ICD-10-CM

## 2023-07-21 PROCEDURE — G0399 HOME SLEEP TEST/TYPE 3 PORTA: HCPCS

## 2023-07-25 ENCOUNTER — TELEPHONE (OUTPATIENT)
Dept: FAMILY MEDICINE CLINIC | Facility: CLINIC | Age: 68
End: 2023-07-25
Payer: MEDICARE

## 2023-07-25 DIAGNOSIS — B35.4 TINEA CORPORIS: ICD-10-CM

## 2023-07-25 NOTE — TELEPHONE ENCOUNTER
Caller: Fam Tao    Relationship: Self    Best call back number: [phone]        Who are you requesting to speak with (clinical staff, provider,  specific staff member): NICHOL ESCALONA    What was the call regarding: MEDICATION THAT WAS PREVIOUSLY PRESCRIBED TO HIM RX    terbinafine (lamiSIL) 250 MG tablet

## 2023-07-25 NOTE — TELEPHONE ENCOUNTER
Pt stated that Radha prescribed terbinafine for 30 days which has helped the itching but the redness is still there-what can be done

## 2023-07-26 RX ORDER — TERBINAFINE HYDROCHLORIDE 250 MG/1
250 TABLET ORAL DAILY
Qty: 14 TABLET | Refills: 0 | Status: SHIPPED | OUTPATIENT
Start: 2023-07-26

## 2023-08-17 ENCOUNTER — TELEPHONE (OUTPATIENT)
Dept: FAMILY MEDICINE CLINIC | Facility: CLINIC | Age: 68
End: 2023-08-17
Payer: MEDICARE

## 2023-08-17 NOTE — TELEPHONE ENCOUNTER
Caller: Fam Tao    Relationship to patient: Self    Best call back number: [phone]     Patient is needing: PATIENT IS WANTING TO KNOW IF HE NEEDS TO GET HIS PNEUMONIA AND FLU SHOT THIS YEAR.     PLEASE CALL PATIENT BACK, IF NO ANSWER LEAVE A DETAILED MESSAGE.

## 2023-08-21 NOTE — TELEPHONE ENCOUNTER
Please call.  He should not need any more pneumonia vaccines.  Okay to get flu shot once these are released.

## 2023-10-06 ENCOUNTER — FLU SHOT (OUTPATIENT)
Dept: FAMILY MEDICINE CLINIC | Facility: CLINIC | Age: 68
End: 2023-10-06
Payer: MEDICARE

## 2023-10-06 DIAGNOSIS — Z23 NEED FOR INFLUENZA VACCINATION: Primary | ICD-10-CM

## 2023-10-06 PROCEDURE — G0008 ADMIN INFLUENZA VIRUS VAC: HCPCS | Performed by: FAMILY MEDICINE

## 2023-10-06 PROCEDURE — 90662 IIV NO PRSV INCREASED AG IM: CPT | Performed by: FAMILY MEDICINE

## 2023-10-19 ENCOUNTER — OFFICE VISIT (OUTPATIENT)
Dept: ENDOCRINOLOGY | Facility: CLINIC | Age: 68
End: 2023-10-19
Payer: MEDICARE

## 2023-10-19 VITALS
WEIGHT: 194 LBS | DIASTOLIC BLOOD PRESSURE: 58 MMHG | HEIGHT: 71 IN | OXYGEN SATURATION: 98 % | BODY MASS INDEX: 27.16 KG/M2 | SYSTOLIC BLOOD PRESSURE: 108 MMHG | HEART RATE: 63 BPM

## 2023-10-19 DIAGNOSIS — E05.90 HYPERTHYROIDISM: Primary | ICD-10-CM

## 2023-10-19 PROCEDURE — 99213 OFFICE O/P EST LOW 20 MIN: CPT | Performed by: INTERNAL MEDICINE

## 2023-10-19 PROCEDURE — 36415 COLL VENOUS BLD VENIPUNCTURE: CPT | Performed by: INTERNAL MEDICINE

## 2023-10-19 PROCEDURE — 3078F DIAST BP <80 MM HG: CPT | Performed by: INTERNAL MEDICINE

## 2023-10-19 PROCEDURE — 3074F SYST BP LT 130 MM HG: CPT | Performed by: INTERNAL MEDICINE

## 2023-10-19 PROCEDURE — 84439 ASSAY OF FREE THYROXINE: CPT | Performed by: INTERNAL MEDICINE

## 2023-10-19 PROCEDURE — 84443 ASSAY THYROID STIM HORMONE: CPT | Performed by: INTERNAL MEDICINE

## 2023-10-19 NOTE — ASSESSMENT & PLAN NOTE
Clinically improved.  Clinically euthyroid. Thyroid levels ordered. Medication to be adjusted accordingly.

## 2023-10-19 NOTE — PROGRESS NOTES
"     Office Note      Date: 10/19/2023  Patient Name: Fam Tao  MRN: [id number]  : 1955    Chief Complaint   Patient presents with    Thyroid Problem     Hyperthyroidism         History of Present Illness:   Fam Tao is a 68 y.o. male who presents for Thyroid Problem (Hyperthyroidism/)  .   Current rx: methimazole 10 mg per day     Changes in history:none   Questions /problems:none . Feels he is doing well     Subjective          Review of Systems:   Review of Systems   Constitutional:  Positive for unexpected weight change. Negative for fatigue.   HENT:  Negative for trouble swallowing.    Eyes:  Negative for visual disturbance.   Cardiovascular:  Negative for palpitations.   Endocrine: Negative for cold intolerance and heat intolerance.   Neurological:  Negative for tremors.   Psychiatric/Behavioral:  Negative for sleep disturbance.        The following portions of the patient's history were reviewed and updated as appropriate: allergies, current medications, past family history, past medical history, past social history, past surgical history, and problem list.    Objective     Visit Vitals  /58 (BP Location: Left arm, Patient Position: Sitting, Cuff Size: Adult)   Pulse 63   Ht 180.3 cm (71\")   Wt 88 kg (194 lb)   SpO2 98%   BMI 27.06 kg/m²           Physical Exam:  Physical Exam  Vitals reviewed.   Constitutional:       Appearance: Normal appearance.   Eyes:      Extraocular Movements: Extraocular movements intact.   Neck:      Comments: Thyroid is diffusely enlarged but getting smaller   Lymphadenopathy:      Cervical: No cervical adenopathy.   Neurological:      Mental Status: He is alert.   Psychiatric:         Mood and Affect: Mood normal.         Behavior: Behavior normal.         Thought Content: Thought content normal.         Judgment: Judgment normal.         Assessment / Plan      Assessment & Plan:  Problem List Items Addressed This Visit          Other    Hyperthyroidism " - Primary    Current Assessment & Plan     Clinically improved.  Clinically euthyroid. Thyroid levels ordered. Medication to be adjusted accordingly.          Relevant Medications    carvedilol (COREG) 6.25 MG tablet    methIMAzole (TAPAZOLE) 10 MG tablet    Other Relevant Orders    T4, Free    TSH        James Dixon MD   10/19/2023

## 2023-10-20 LAB
T4 FREE SERPL-MCNC: 1.02 NG/DL (ref 0.93–1.7)
TSH SERPL DL<=0.05 MIU/L-ACNC: 1.94 UIU/ML (ref 0.27–4.2)

## 2023-11-17 ENCOUNTER — HOSPITAL ENCOUNTER (OUTPATIENT)
Facility: HOSPITAL | Age: 68
Setting detail: OBSERVATION
Discharge: HOME | End: 2023-11-18
Attending: STUDENT IN AN ORGANIZED HEALTH CARE EDUCATION/TRAINING PROGRAM | Admitting: HOSPITALIST
Payer: OTHER MISCELLANEOUS

## 2023-11-17 DIAGNOSIS — M96.1 LUMBAR POST-LAMINECTOMY SYNDROME: ICD-10-CM

## 2023-11-17 DIAGNOSIS — N18.2 CKD (CHRONIC KIDNEY DISEASE) STAGE 2, GFR 60-89 ML/MIN: ICD-10-CM

## 2023-11-17 DIAGNOSIS — R11.0 NAUSEA: ICD-10-CM

## 2023-11-17 DIAGNOSIS — T40.601A NARCOTIC OVERDOSE, ACCIDENTAL OR UNINTENTIONAL, INITIAL ENCOUNTER: Primary | ICD-10-CM

## 2023-11-17 PROBLEM — J44.9 COPD (CHRONIC OBSTRUCTIVE PULMONARY DISEASE): Status: ACTIVE | Noted: 2023-11-17

## 2023-11-17 PROBLEM — F41.9 ANXIETY AND DEPRESSION: Status: ACTIVE | Noted: 2022-03-11

## 2023-11-17 PROBLEM — E78.5 HLD (HYPERLIPIDEMIA): Status: ACTIVE | Noted: 2023-11-17

## 2023-11-17 PROBLEM — F32.A ANXIETY AND DEPRESSION: Status: ACTIVE | Noted: 2022-03-11

## 2023-11-17 LAB
ALBUMIN SERPL-MCNC: 4.8 G/DL (ref 3.5–5.2)
ALBUMIN/GLOB SERPL: 1.9 G/DL
ALP SERPL-CCNC: 144 U/L (ref 39–117)
ALT SERPL W P-5'-P-CCNC: 34 U/L (ref 1–41)
AMPHET+METHAMPHET UR QL: NEGATIVE
AMPHETAMINES UR QL: NEGATIVE
ANION GAP SERPL CALCULATED.3IONS-SCNC: 11 MMOL/L (ref 5–15)
APAP SERPL-MCNC: <5 MCG/ML (ref 0–30)
AST SERPL-CCNC: 26 U/L (ref 1–40)
ATMOSPHERIC PRESS: NORMAL MM[HG]
BARBITURATES UR QL SCN: NEGATIVE
BASE EXCESS BLDV CALC-SCNC: -0.8 MMOL/L (ref -2–2)
BASOPHILS # BLD AUTO: 0.08 10*3/MM3 (ref 0–0.2)
BASOPHILS NFR BLD AUTO: 0.8 % (ref 0–1.5)
BDY SITE: NORMAL
BENZODIAZ UR QL SCN: NEGATIVE
BILIRUB SERPL-MCNC: 0.3 MG/DL (ref 0–1.2)
BILIRUB UR QL STRIP: NEGATIVE
BODY TEMPERATURE: 37
BUN SERPL-MCNC: 18 MG/DL (ref 8–23)
BUN/CREAT SERPL: 12.9 (ref 7–25)
BUPRENORPHINE SERPL-MCNC: NEGATIVE NG/ML
CALCIUM SPEC-SCNC: 9.5 MG/DL (ref 8.6–10.5)
CANNABINOIDS SERPL QL: NEGATIVE
CHLORIDE SERPL-SCNC: 104 MMOL/L (ref 98–107)
CLARITY UR: CLEAR
CO2 BLDA-SCNC: 26.3 MMOL/L (ref 22–33)
CO2 SERPL-SCNC: 24 MMOL/L (ref 22–29)
COCAINE UR QL: NEGATIVE
COHGB MFR BLD: 1.2 %
COLOR UR: YELLOW
CREAT SERPL-MCNC: 1.39 MG/DL (ref 0.76–1.27)
DEPRECATED RDW RBC AUTO: 42.6 FL (ref 37–54)
EGFRCR SERPLBLD CKD-EPI 2021: 55.6 ML/MIN/1.73
EOSINOPHIL # BLD AUTO: 0.07 10*3/MM3 (ref 0–0.4)
EOSINOPHIL NFR BLD AUTO: 0.7 % (ref 0.3–6.2)
EPAP: 0
ERYTHROCYTE [DISTWIDTH] IN BLOOD BY AUTOMATED COUNT: 12.4 % (ref 12.3–15.4)
ETHANOL BLD-MCNC: <10 MG/DL (ref 0–10)
FENTANYL UR-MCNC: NEGATIVE NG/ML
GLOBULIN UR ELPH-MCNC: 2.5 GM/DL
GLUCOSE SERPL-MCNC: 150 MG/DL (ref 65–99)
GLUCOSE UR STRIP-MCNC: NEGATIVE MG/DL
HCO3 BLDV-SCNC: 25 MMOL/L (ref 22–28)
HCT VFR BLD AUTO: 46 % (ref 37.5–51)
HGB BLD-MCNC: 15.4 G/DL (ref 13–17.7)
HGB BLDA-MCNC: 14.2 G/DL (ref 13.5–17.5)
HGB UR QL STRIP.AUTO: NEGATIVE
IMM GRANULOCYTES # BLD AUTO: 0.05 10*3/MM3 (ref 0–0.05)
IMM GRANULOCYTES NFR BLD AUTO: 0.5 % (ref 0–0.5)
INHALED O2 CONCENTRATION: 21 %
IPAP: 0
KETONES UR QL STRIP: NEGATIVE
LEUKOCYTE ESTERASE UR QL STRIP.AUTO: NEGATIVE
LYMPHOCYTES # BLD AUTO: 1.28 10*3/MM3 (ref 0.7–3.1)
LYMPHOCYTES NFR BLD AUTO: 12.9 % (ref 19.6–45.3)
MCH RBC QN AUTO: 31.1 PG (ref 26.6–33)
MCHC RBC AUTO-ENTMCNC: 33.5 G/DL (ref 31.5–35.7)
MCV RBC AUTO: 92.9 FL (ref 79–97)
METHADONE UR QL SCN: NEGATIVE
METHGB BLD QL: 0.3 %
MODALITY: NORMAL
MONOCYTES # BLD AUTO: 0.45 10*3/MM3 (ref 0.1–0.9)
MONOCYTES NFR BLD AUTO: 4.5 % (ref 5–12)
NEUTROPHILS NFR BLD AUTO: 8.01 10*3/MM3 (ref 1.7–7)
NEUTROPHILS NFR BLD AUTO: 80.6 % (ref 42.7–76)
NITRITE UR QL STRIP: NEGATIVE
NRBC BLD AUTO-RTO: 0 /100 WBC (ref 0–0.2)
OPIATES UR QL: POSITIVE
OXYCODONE UR QL SCN: NEGATIVE
OXYHGB MFR BLDV: 78 %
PAW @ PEAK INSP FLOW SETTING VENT: 0 CMH2O
PCO2 BLDV: 44.3 MM HG (ref 41–51)
PCP UR QL SCN: NEGATIVE
PH BLDV: 7.36 PH UNITS (ref 7.31–7.41)
PH UR STRIP.AUTO: 5.5 [PH] (ref 5–8)
PLATELET # BLD AUTO: 271 10*3/MM3 (ref 140–450)
PMV BLD AUTO: 9.3 FL (ref 6–12)
PO2 BLDV: 42.9 MM HG (ref 27–53)
POTASSIUM SERPL-SCNC: 4.3 MMOL/L (ref 3.5–5.2)
PROT SERPL-MCNC: 7.3 G/DL (ref 6–8.5)
PROT UR QL STRIP: NEGATIVE
RBC # BLD AUTO: 4.95 10*6/MM3 (ref 4.14–5.8)
SALICYLATES SERPL-MCNC: <0.3 MG/DL
SODIUM SERPL-SCNC: 139 MMOL/L (ref 136–145)
SP GR UR STRIP: 1.01 (ref 1–1.03)
TOTAL RATE: 0 BREATHS/MINUTE
TRICYCLICS UR QL SCN: NEGATIVE
UROBILINOGEN UR QL STRIP: NORMAL
WBC NRBC COR # BLD AUTO: 9.94 10*3/MM3 (ref 3.4–10.8)

## 2023-11-17 PROCEDURE — 93005 ELECTROCARDIOGRAM TRACING: CPT | Performed by: INTERNAL MEDICINE

## 2023-11-17 PROCEDURE — 96372 THER/PROPH/DIAG INJ SC/IM: CPT

## 2023-11-17 PROCEDURE — G0378 HOSPITAL OBSERVATION PER HR: HCPCS

## 2023-11-17 PROCEDURE — 25810000003 SODIUM CHLORIDE 0.9 % SOLUTION: Performed by: STUDENT IN AN ORGANIZED HEALTH CARE EDUCATION/TRAINING PROGRAM

## 2023-11-17 PROCEDURE — 80179 DRUG ASSAY SALICYLATE: CPT | Performed by: STUDENT IN AN ORGANIZED HEALTH CARE EDUCATION/TRAINING PROGRAM

## 2023-11-17 PROCEDURE — 82805 BLOOD GASES W/O2 SATURATION: CPT

## 2023-11-17 PROCEDURE — 96374 THER/PROPH/DIAG INJ IV PUSH: CPT

## 2023-11-17 PROCEDURE — 85025 COMPLETE CBC W/AUTO DIFF WBC: CPT | Performed by: STUDENT IN AN ORGANIZED HEALTH CARE EDUCATION/TRAINING PROGRAM

## 2023-11-17 PROCEDURE — 80307 DRUG TEST PRSMV CHEM ANLYZR: CPT | Performed by: STUDENT IN AN ORGANIZED HEALTH CARE EDUCATION/TRAINING PROGRAM

## 2023-11-17 PROCEDURE — 80053 COMPREHEN METABOLIC PANEL: CPT | Performed by: STUDENT IN AN ORGANIZED HEALTH CARE EDUCATION/TRAINING PROGRAM

## 2023-11-17 PROCEDURE — 81003 URINALYSIS AUTO W/O SCOPE: CPT | Performed by: STUDENT IN AN ORGANIZED HEALTH CARE EDUCATION/TRAINING PROGRAM

## 2023-11-17 PROCEDURE — 25010000002 HEPARIN (PORCINE) PER 1000 UNITS: Performed by: PHYSICIAN ASSISTANT

## 2023-11-17 PROCEDURE — 25010000002 ONDANSETRON PER 1 MG: Performed by: STUDENT IN AN ORGANIZED HEALTH CARE EDUCATION/TRAINING PROGRAM

## 2023-11-17 PROCEDURE — 82077 ASSAY SPEC XCP UR&BREATH IA: CPT | Performed by: STUDENT IN AN ORGANIZED HEALTH CARE EDUCATION/TRAINING PROGRAM

## 2023-11-17 PROCEDURE — 99284 EMERGENCY DEPT VISIT MOD MDM: CPT

## 2023-11-17 PROCEDURE — 80143 DRUG ASSAY ACETAMINOPHEN: CPT | Performed by: STUDENT IN AN ORGANIZED HEALTH CARE EDUCATION/TRAINING PROGRAM

## 2023-11-17 RX ORDER — CHOLECALCIFEROL (VITAMIN D3) 125 MCG
5 CAPSULE ORAL NIGHTLY PRN
Status: DISCONTINUED | OUTPATIENT
Start: 2023-11-17 | End: 2023-11-18 | Stop reason: HOSPADM

## 2023-11-17 RX ORDER — POLYETHYLENE GLYCOL 3350 17 G/17G
17 POWDER, FOR SOLUTION ORAL DAILY PRN
Status: DISCONTINUED | OUTPATIENT
Start: 2023-11-17 | End: 2023-11-18 | Stop reason: HOSPADM

## 2023-11-17 RX ORDER — NALOXONE HCL 0.4 MG/ML
0.2 VIAL (ML) INJECTION AS NEEDED
Status: DISCONTINUED | OUTPATIENT
Start: 2023-11-17 | End: 2023-11-18 | Stop reason: HOSPADM

## 2023-11-17 RX ORDER — BISACODYL 10 MG
10 SUPPOSITORY, RECTAL RECTAL DAILY PRN
Status: DISCONTINUED | OUTPATIENT
Start: 2023-11-17 | End: 2023-11-18 | Stop reason: HOSPADM

## 2023-11-17 RX ORDER — CLOPIDOGREL BISULFATE 75 MG/1
75 TABLET ORAL DAILY
Status: DISCONTINUED | OUTPATIENT
Start: 2023-11-18 | End: 2023-11-18 | Stop reason: HOSPADM

## 2023-11-17 RX ORDER — PANTOPRAZOLE SODIUM 40 MG/1
40 TABLET, DELAYED RELEASE ORAL
Status: DISCONTINUED | OUTPATIENT
Start: 2023-11-18 | End: 2023-11-18 | Stop reason: HOSPADM

## 2023-11-17 RX ORDER — BUSPIRONE HYDROCHLORIDE 10 MG/1
30 TABLET ORAL EVERY 12 HOURS SCHEDULED
Status: DISCONTINUED | OUTPATIENT
Start: 2023-11-17 | End: 2023-11-18 | Stop reason: HOSPADM

## 2023-11-17 RX ORDER — ONDANSETRON 2 MG/ML
4 INJECTION INTRAMUSCULAR; INTRAVENOUS ONCE
Status: COMPLETED | OUTPATIENT
Start: 2023-11-17 | End: 2023-11-17

## 2023-11-17 RX ORDER — SODIUM CHLORIDE 0.9 % (FLUSH) 0.9 %
10 SYRINGE (ML) INJECTION EVERY 12 HOURS SCHEDULED
Status: DISCONTINUED | OUTPATIENT
Start: 2023-11-17 | End: 2023-11-18 | Stop reason: HOSPADM

## 2023-11-17 RX ORDER — CARVEDILOL 6.25 MG/1
6.25 TABLET ORAL 2 TIMES DAILY WITH MEALS
Status: DISCONTINUED | OUTPATIENT
Start: 2023-11-17 | End: 2023-11-18 | Stop reason: HOSPADM

## 2023-11-17 RX ORDER — BISACODYL 5 MG/1
5 TABLET, DELAYED RELEASE ORAL DAILY PRN
Status: DISCONTINUED | OUTPATIENT
Start: 2023-11-17 | End: 2023-11-18 | Stop reason: HOSPADM

## 2023-11-17 RX ORDER — AMLODIPINE BESYLATE 5 MG/1
5 TABLET ORAL
Status: DISCONTINUED | OUTPATIENT
Start: 2023-11-18 | End: 2023-11-18 | Stop reason: HOSPADM

## 2023-11-17 RX ORDER — LISINOPRIL 5 MG/1
5 TABLET ORAL EVERY 12 HOURS SCHEDULED
Status: DISCONTINUED | OUTPATIENT
Start: 2023-11-17 | End: 2023-11-18 | Stop reason: HOSPADM

## 2023-11-17 RX ORDER — ASPIRIN 81 MG/1
81 TABLET, CHEWABLE ORAL DAILY
Status: DISCONTINUED | OUTPATIENT
Start: 2023-11-18 | End: 2023-11-18 | Stop reason: HOSPADM

## 2023-11-17 RX ORDER — FLUVOXAMINE MALEATE 50 MG/1
100 TABLET, COATED ORAL EVERY 12 HOURS SCHEDULED
Status: DISCONTINUED | OUTPATIENT
Start: 2023-11-17 | End: 2023-11-18 | Stop reason: HOSPADM

## 2023-11-17 RX ORDER — ACETAMINOPHEN 325 MG/1
650 TABLET ORAL EVERY 4 HOURS PRN
Status: DISCONTINUED | OUTPATIENT
Start: 2023-11-17 | End: 2023-11-18 | Stop reason: HOSPADM

## 2023-11-17 RX ORDER — SODIUM CHLORIDE 0.9 % (FLUSH) 0.9 %
10 SYRINGE (ML) INJECTION AS NEEDED
Status: DISCONTINUED | OUTPATIENT
Start: 2023-11-17 | End: 2023-11-18 | Stop reason: HOSPADM

## 2023-11-17 RX ORDER — HEPARIN SODIUM 5000 [USP'U]/ML
5000 INJECTION, SOLUTION INTRAVENOUS; SUBCUTANEOUS EVERY 12 HOURS SCHEDULED
Status: DISCONTINUED | OUTPATIENT
Start: 2023-11-17 | End: 2023-11-18 | Stop reason: HOSPADM

## 2023-11-17 RX ORDER — SODIUM CHLORIDE 9 MG/ML
40 INJECTION, SOLUTION INTRAVENOUS AS NEEDED
Status: DISCONTINUED | OUTPATIENT
Start: 2023-11-17 | End: 2023-11-18 | Stop reason: HOSPADM

## 2023-11-17 RX ORDER — AMOXICILLIN 250 MG
2 CAPSULE ORAL 2 TIMES DAILY
Status: DISCONTINUED | OUTPATIENT
Start: 2023-11-17 | End: 2023-11-18 | Stop reason: HOSPADM

## 2023-11-17 RX ORDER — ONDANSETRON 2 MG/ML
4 INJECTION INTRAMUSCULAR; INTRAVENOUS EVERY 6 HOURS PRN
Status: DISCONTINUED | OUTPATIENT
Start: 2023-11-17 | End: 2023-11-18 | Stop reason: HOSPADM

## 2023-11-17 RX ORDER — METHIMAZOLE 5 MG/1
10 TABLET ORAL DAILY
Status: DISCONTINUED | OUTPATIENT
Start: 2023-11-18 | End: 2023-11-18 | Stop reason: HOSPADM

## 2023-11-17 RX ADMIN — ONDANSETRON 4 MG: 2 INJECTION INTRAMUSCULAR; INTRAVENOUS at 19:46

## 2023-11-17 RX ADMIN — BUSPIRONE HYDROCHLORIDE 30 MG: 10 TABLET ORAL at 21:48

## 2023-11-17 RX ADMIN — CARVEDILOL 6.25 MG: 6.25 TABLET, FILM COATED ORAL at 21:48

## 2023-11-17 RX ADMIN — FLUVOXAMINE MALEATE 100 MG: 50 TABLET ORAL at 21:48

## 2023-11-17 RX ADMIN — Medication 10 ML: at 21:49

## 2023-11-17 RX ADMIN — HEPARIN SODIUM 5000 UNITS: 5000 INJECTION INTRAVENOUS; SUBCUTANEOUS at 21:49

## 2023-11-17 RX ADMIN — LISINOPRIL 5 MG: 5 TABLET ORAL at 21:48

## 2023-11-17 RX ADMIN — SODIUM CHLORIDE 1000 ML: 9 INJECTION, SOLUTION INTRAVENOUS at 19:47

## 2023-11-17 NOTE — Clinical Note
Level of Care: Telemetry [5]   Diagnosis: Narcotic overdose, accidental or unintentional, initial encounter [[id number]]   Admitting Physician: JAH FRENCH [[id number]]   Attending Physician: JAH FRENCH [[id number]]

## 2023-11-18 ENCOUNTER — READMISSION MANAGEMENT (OUTPATIENT)
Dept: CALL CENTER | Facility: HOSPITAL | Age: 68
End: 2023-11-18
Payer: MEDICARE

## 2023-11-18 VITALS
DIASTOLIC BLOOD PRESSURE: 58 MMHG | HEIGHT: 71 IN | RESPIRATION RATE: 18 BRPM | TEMPERATURE: 98.1 F | BODY MASS INDEX: 26.6 KG/M2 | WEIGHT: 190 LBS | HEART RATE: 69 BPM | SYSTOLIC BLOOD PRESSURE: 118 MMHG | OXYGEN SATURATION: 94 %

## 2023-11-18 LAB
ALBUMIN SERPL-MCNC: 4.1 G/DL (ref 3.5–5.2)
ALBUMIN/GLOB SERPL: 2.6 G/DL
ALP SERPL-CCNC: 111 U/L (ref 39–117)
ALT SERPL W P-5'-P-CCNC: 26 U/L (ref 1–41)
ANION GAP SERPL CALCULATED.3IONS-SCNC: 8 MMOL/L (ref 5–15)
AST SERPL-CCNC: 20 U/L (ref 1–40)
BASOPHILS # BLD AUTO: 0.08 10*3/MM3 (ref 0–0.2)
BASOPHILS NFR BLD AUTO: 0.8 % (ref 0–1.5)
BILIRUB SERPL-MCNC: 0.3 MG/DL (ref 0–1.2)
BUN SERPL-MCNC: 17 MG/DL (ref 8–23)
BUN/CREAT SERPL: 12.8 (ref 7–25)
CALCIUM SPEC-SCNC: 8.8 MG/DL (ref 8.6–10.5)
CHLORIDE SERPL-SCNC: 108 MMOL/L (ref 98–107)
CO2 SERPL-SCNC: 27 MMOL/L (ref 22–29)
CREAT SERPL-MCNC: 1.33 MG/DL (ref 0.76–1.27)
DEPRECATED RDW RBC AUTO: 43.1 FL (ref 37–54)
EGFRCR SERPLBLD CKD-EPI 2021: 58.6 ML/MIN/1.73
EOSINOPHIL # BLD AUTO: 0.15 10*3/MM3 (ref 0–0.4)
EOSINOPHIL NFR BLD AUTO: 1.6 % (ref 0.3–6.2)
ERYTHROCYTE [DISTWIDTH] IN BLOOD BY AUTOMATED COUNT: 12.5 % (ref 12.3–15.4)
GLOBULIN UR ELPH-MCNC: 1.6 GM/DL
GLUCOSE SERPL-MCNC: 103 MG/DL (ref 65–99)
HCT VFR BLD AUTO: 39.1 % (ref 37.5–51)
HGB BLD-MCNC: 12.9 G/DL (ref 13–17.7)
IMM GRANULOCYTES # BLD AUTO: 0.05 10*3/MM3 (ref 0–0.05)
IMM GRANULOCYTES NFR BLD AUTO: 0.5 % (ref 0–0.5)
LYMPHOCYTES # BLD AUTO: 2.93 10*3/MM3 (ref 0.7–3.1)
LYMPHOCYTES NFR BLD AUTO: 30.6 % (ref 19.6–45.3)
MCH RBC QN AUTO: 30.9 PG (ref 26.6–33)
MCHC RBC AUTO-ENTMCNC: 33 G/DL (ref 31.5–35.7)
MCV RBC AUTO: 93.8 FL (ref 79–97)
MONOCYTES # BLD AUTO: 0.7 10*3/MM3 (ref 0.1–0.9)
MONOCYTES NFR BLD AUTO: 7.3 % (ref 5–12)
NEUTROPHILS NFR BLD AUTO: 5.67 10*3/MM3 (ref 1.7–7)
NEUTROPHILS NFR BLD AUTO: 59.2 % (ref 42.7–76)
NRBC BLD AUTO-RTO: 0 /100 WBC (ref 0–0.2)
PLATELET # BLD AUTO: 240 10*3/MM3 (ref 140–450)
PMV BLD AUTO: 9.7 FL (ref 6–12)
POTASSIUM SERPL-SCNC: 4.3 MMOL/L (ref 3.5–5.2)
PROT SERPL-MCNC: 5.7 G/DL (ref 6–8.5)
RBC # BLD AUTO: 4.17 10*6/MM3 (ref 4.14–5.8)
SODIUM SERPL-SCNC: 143 MMOL/L (ref 136–145)
WBC NRBC COR # BLD AUTO: 9.58 10*3/MM3 (ref 3.4–10.8)

## 2023-11-18 PROCEDURE — 25010000002 HEPARIN (PORCINE) PER 1000 UNITS: Performed by: PHYSICIAN ASSISTANT

## 2023-11-18 PROCEDURE — 96372 THER/PROPH/DIAG INJ SC/IM: CPT

## 2023-11-18 PROCEDURE — G0378 HOSPITAL OBSERVATION PER HR: HCPCS

## 2023-11-18 PROCEDURE — 85025 COMPLETE CBC W/AUTO DIFF WBC: CPT | Performed by: PHYSICIAN ASSISTANT

## 2023-11-18 PROCEDURE — 80053 COMPREHEN METABOLIC PANEL: CPT | Performed by: PHYSICIAN ASSISTANT

## 2023-11-18 RX ORDER — NALOXONE HYDROCHLORIDE 4 MG/.1ML
1 SPRAY NASAL AS NEEDED
Qty: 2 EACH | Refills: 0 | Status: SHIPPED | OUTPATIENT
Start: 2023-11-18

## 2023-11-18 RX ADMIN — BUSPIRONE HYDROCHLORIDE 30 MG: 10 TABLET ORAL at 08:56

## 2023-11-18 RX ADMIN — CLOPIDOGREL BISULFATE 75 MG: 75 TABLET ORAL at 08:56

## 2023-11-18 RX ADMIN — LISINOPRIL 5 MG: 5 TABLET ORAL at 08:56

## 2023-11-18 RX ADMIN — CARVEDILOL 6.25 MG: 6.25 TABLET, FILM COATED ORAL at 08:56

## 2023-11-18 RX ADMIN — SENNOSIDES AND DOCUSATE SODIUM 2 TABLET: 8.6; 5 TABLET ORAL at 08:55

## 2023-11-18 RX ADMIN — ASPIRIN 81 MG CHEWABLE TABLET 81 MG: 81 TABLET CHEWABLE at 08:56

## 2023-11-18 RX ADMIN — METHIMAZOLE 10 MG: 5 TABLET ORAL at 08:58

## 2023-11-18 RX ADMIN — Medication 10 ML: at 08:57

## 2023-11-18 RX ADMIN — PANTOPRAZOLE SODIUM 40 MG: 40 TABLET, DELAYED RELEASE ORAL at 06:35

## 2023-11-18 RX ADMIN — HEPARIN SODIUM 5000 UNITS: 5000 INJECTION INTRAVENOUS; SUBCUTANEOUS at 08:55

## 2023-11-18 RX ADMIN — AMLODIPINE BESYLATE 5 MG: 5 TABLET ORAL at 08:56

## 2023-11-18 RX ADMIN — FLUVOXAMINE MALEATE 100 MG: 50 TABLET ORAL at 08:58

## 2023-11-18 NOTE — H&P
[facility] Medicine Services  HISTORY AND PHYSICAL    Patient Name: Fam Tao  : 1955  MRN: [id number]  Primary Care Physician: Wilfredo Salvador MD  Date of admission: 2023    Subjective   Subjective     Chief Complaint:  Medication Overdose    HPI:  Fam Tao is a 68 y.o. male with a past medical history significant for CAD s/p stent placement, HTN, HLD, chronic back pain s/p laminectomy with intrathecal pain pump insertion, CKD II, reformed smoker with COPD, Hyperthyroidism, and anxiety/depression. He presents today with home health nurse with concerns of accidental overdose. Home health nurse at bedside volunteers that she refilled the patient's Dilaudid pain pump this morning. States around 0900 she filled the reservoir with 19 cc of fluid that contains 8 mg of Dilaudid per cc and 8 mg of bupivacaine per cc. Around 1200, patient called the nurse and complained of acute onset lethargy/sleepiness, dizziness, and nausea without vomiting. Nurse returned to check the function of the pain pump and noted that there was no issue with the flow rate. However upon checking the reservoir it only had 13 cc in it. Nurse suspects that she missed the reservoir when she was filling it, dumping the 6 cc in the subcutaneous fat of his abdomen. Was concerned and brought the patient in for further evaluation and treatment.  Currently there are no complaints of fever, cough, congestion, SOB, or chest pain. No abdominal pain, vomiting, dysuria or flank pain. No headache or focal weakness/ paresthesias. Will admit to inpatient.      Review of Systems   Constitutional:  Positive for fatigue. Negative for chills.   HENT:  Negative for congestion and trouble swallowing.    Eyes:  Negative for photophobia and visual disturbance.   Respiratory:  Negative for cough and shortness of breath.    Cardiovascular:  Negative for chest pain and leg swelling.   Gastrointestinal:  Positive for  nausea. Negative for abdominal pain and vomiting.   Endocrine: Negative for cold intolerance and heat intolerance.   Genitourinary:  Negative for dysuria and flank pain.   Musculoskeletal:  Negative for back pain and gait problem.   Skin:  Negative for pallor and rash.   Allergic/Immunologic: Negative for immunocompromised state.   Neurological:  Positive for dizziness and weakness. Negative for numbness and headaches.   Hematological:  Negative for adenopathy.   Psychiatric/Behavioral:  Negative for agitation and confusion.                 Personal History     Past Medical History:   Diagnosis Date    Arthritis     Coronary artery disease     Hypertension     Hypothyroidism     Sleep apnea              Past Surgical History:   Procedure Laterality Date    APPENDECTOMY      CARDIAC CATHETERIZATION  2015    3 stents    LUMBAR DISCECTOMY ANTERIOR POSTERIOR FUSION INSTRUMENTATION  2011    [facility]     PAIN PUMP INSERTION/REVISION      PAIN PUMP INSERTION/REVISION N/A 1/19/2017    Procedure: INTRARHECAL PAIN PUMP REPLACEMENT;  Surgeon: Nir Gilman MD;  Location: [facility];  Service:        Family History: family history includes Diabetes in his father and mother; Heart disease in his mother; Heart failure in his father; Hypertension in his mother.     Social History:  reports that he quit smoking about 13 years ago. His smoking use included cigarettes. He has a 30.00 pack-year smoking history. He has never used smokeless tobacco. He reports that he does not drink alcohol and does not use drugs.  Social History     Social History Narrative    Not on file       Medications:  Evolocumab, amLODIPine, aspirin, busPIRone, carvedilol, clopidogrel, fluvoxaMINE, lisinopril, meclizine, meloxicam, methIMAzole, nitroglycerin, pantoprazole, and terbinafine    Allergies   Allergen Reactions    Penicillins Shortness Of Breath       Objective   Objective     Vital Signs:   Temp:  [98.3 °F (36.8 °C)] 98.3 °F (36.8 °C)  Heart Rate:   [113] 113  Resp:  [18] 18  BP: (162)/(90) 162/90    Physical Exam   Constitutional: Awake, alert  Eyes: PERRLA, sclerae anicteric, no conjunctival injection  HENT: NCAT, mucous membranes moist  Neck: Supple, no thyromegaly, no lymphadenopathy, trachea midline  Respiratory: Clear to auscultation bilaterally, nonlabored respirations   Cardiovascular: RRR, no murmurs, rubs, or gallops, palpable pedal pulses bilaterally  Gastrointestinal: Positive bowel sounds, soft, nontender, nondistended  Musculoskeletal: No bilateral ankle edema, no clubbing or cyanosis to extremities  Psychiatric: Appropriate affect, cooperative  Neurologic: Oriented x 3, strength symmetric in all extremities, Cranial Nerves grossly intact to confrontation, speech clear  Skin: No rashes      Result Review:  I have personally reviewed the results from the time of this admission to 11/17/2023 19:52 EST and agree with these findings:  [x]  Laboratory list / accordion  []  Microbiology  []  Radiology  []  EKG/Telemetry   []  Cardiology/Vascular   []  Pathology  [x]  Old records  []  Other:  Most notable findings include: vitals stable. Creatinine 1.39. alk phos 144. Chemistry and hematology otherwise favorable.    LAB RESULTS:      Lab 11/17/23  1856   WBC 9.94   HEMOGLOBIN 15.4   HEMATOCRIT 46.0   PLATELETS 271   NEUTROS ABS 8.01*   IMMATURE GRANS (ABS) 0.05   LYMPHS ABS 1.28   MONOS ABS 0.45   EOS ABS 0.07   MCV 92.9         Lab 11/17/23  1856   SODIUM 139   POTASSIUM 4.3   CHLORIDE 104   CO2 24.0   ANION GAP 11.0   BUN 18   CREATININE 1.39*   EGFR 55.6*   GLUCOSE 150*   CALCIUM 9.5         Lab 11/17/23  1856   TOTAL PROTEIN 7.3   ALBUMIN 4.8   GLOBULIN 2.5   ALT (SGPT) 34   AST (SGOT) 26   BILIRUBIN 0.3   ALK PHOS 144*                     Brief Urine Lab Results       None          Microbiology Results (last 10 days)       ** No results found for the last 240 hours. **            No radiology results from the last 24 hrs        Assessment & Plan    Assessment & Plan       Narcotic overdose, accidental or unintentional, initial encounter    Essential hypertension    Coronary artery disease involving native coronary artery of native heart without angina pectoris    CKD (chronic kidney disease) stage 2, GFR 60-89 ml/min    COPD (chronic obstructive pulmonary disease)    Hyperthyroidism    Anxiety and depression    Gastroesophageal reflux disease without esophagitis    HLD (hyperlipidemia)    Lumbar post-laminectomy syndrome    Accidental Overdose  Chronic Back Pain  Intrathecal Dilaudid Pain Pump  - possible 6 cc in the subcutaneous fat of his abdomen ( 48 mg over the course of 6 hours)  - patient hemodynamically stable. Alert and oriented X4, GCS 15, mentation at baseline  - airway patent, no respiratory distress  - has no required Narcan, keep PRN  - neuro checks  - ED discussed with toxicology at [facility], advise to monitor overnight  - am labs    CAD s/p stent  HTN  HLD  - stable  - continue ASA, Plavix, Statin  - continue lisinopril, norvasc, coreg  - EKG as needed    CKD II  - baseline ~ 1.4  - stable.  - avoid nephrotoxins  - strict I&O    COPD  - former smoker. Not on supplemental oxygen  - as needed pulmonary toilet    Hyperthyroidism  - continue methimazole    Anxiety/depression  - continue Buspar, Luvol    DVT prophylaxis:  heparin    CODE STATUS:  full code  Level Of Support Discussed With: Patient  Code Status (Patient has no pulse and is not breathing): CPR (Attempt to Resuscitate)  Medical Interventions (Patient has pulse or is breathing): Full Support      Expected Discharge  TBD      This note has been completed as part of a split-shared workflow.     Signature: Electronically signed by Rachael Smith PA-C, 11/17/23, 7:53 PM EST      Total time spent: 90 minutes  Time spent includes time reviewing chart, face-to-face time, counseling patient/family/caregiver, ordering medications/tests/procedures, communicating with other health care  professionals, documenting clinical information in the electronic health record, and coordination of care.

## 2023-11-18 NOTE — CASE MANAGEMENT/SOCIAL WORK
Continued Stay Note  [facility]     Patient Name: Fam Tao  MRN: [id number]  Today's Date: 11/18/2023    Admit Date: 11/17/2023    Plan: MSW to follow-up   Discharge Plan       Row Name 11/18/23 1212       Plan    Plan MSW to follow-up    Plan Comments MSW received call from RN regarding consult. RN updated MSW of situation and reports that Pt and Pt’s wife requesting to speak to MSW. MSW met with Pt and Pt’s wife at bedside. Pt reported the situation regarding [facility] RN, Norberto Gonzalez, who visits every three months had a complication with his pain pump. Pt and Pt’s wife very concerned and frustrated with situation; MSW validated emotions and offered support. Pt sees Sreekanth Estrada MD for pain management at the [facility]. MSW reported that MSW team on Monday, 11/20/2023 can call [facility] ([phone]) and report situation along with requesting new RN to provide Pt’s care. Pt’s wife reported she would also call and report situation.  Pt reported he is going to attempt to reach out to Dr. Estrada and explore alternative options moving forward. Pt and Pt’s wife applicative. MSW updated RN.  MSW available.    Final Discharge Disposition Code 01 - home or self-care                   Discharge Codes    No documentation.                 Expected Discharge Date and Time       Expected Discharge Date Expected Discharge Time    Nov 18, 2023               RAD Malone

## 2023-11-18 NOTE — PLAN OF CARE
Goal Outcome Evaluation:      No new event since admission to unit. Pt is alert and oriented, denies any symptom. Does have some tachycardia with activity. Per family and home infusion nurse, pt is back to his baseline.        Problem: Adult Inpatient Plan of Care  Goal: Plan of Care Review  Outcome: Ongoing, Progressing  Goal: Patient-Specific Goal (Individualized)  Outcome: Ongoing, Progressing  Goal: Absence of Hospital-Acquired Illness or Injury  Outcome: Ongoing, Progressing  Intervention: Identify and Manage Fall Risk  Recent Flowsheet Documentation  Taken 11/18/2023 0411 by Nena Abbott RN  Safety Promotion/Fall Prevention: safety round/check completed  Taken 11/18/2023 0243 by Nena Abbott RN  Safety Promotion/Fall Prevention: safety round/check completed  Taken 11/18/2023 0014 by Nena Abbott RN  Safety Promotion/Fall Prevention: safety round/check completed  Taken 11/17/2023 2200 by Nena Abbott RN  Safety Promotion/Fall Prevention:   activity supervised   fall prevention program maintained   nonskid shoes/slippers when out of bed   safety round/check completed  Intervention: Prevent Skin Injury  Recent Flowsheet Documentation  Taken 11/17/2023 2200 by Nena Abbott RN  Body Position: position changed independently  Taken 11/17/2023 2145 by Nena Abbott RN  Skin Protection:   adhesive use limited   tubing/devices free from skin contact  Intervention: Prevent and Manage VTE (Venous Thromboembolism) Risk  Recent Flowsheet Documentation  Taken 11/17/2023 2145 by Nena Abbott RN  Activity Management: activity encouraged  VTE Prevention/Management: (Heparin subq) other (see comments)  Goal: Optimal Comfort and Wellbeing  Outcome: Ongoing, Progressing  Intervention: Provide Person-Centered Care  Recent Flowsheet Documentation  Taken 11/17/2023 2145 by Nena Abbott RN  Trust Relationship/Rapport:   care explained   choices provided  Goal: Readiness for Transition of  Care  Outcome: Ongoing, Progressing     Problem: Pain Chronic (Persistent) (Comorbidity Management)  Goal: Acceptable Pain Control and Functional Ability  Outcome: Ongoing, Progressing  Intervention: Optimize Psychosocial Wellbeing  Recent Flowsheet Documentation  Taken 11/17/2023 2145 by Nena Abbott RN  Diversional Activities: television     Problem: Hemodynamic Instability (Overdose)  Goal: Effective Tissue Perfusion  Outcome: Ongoing, Progressing     Problem: Neurologic Impairment (Overdose)  Goal: Optimal Neurologic Function  Outcome: Ongoing, Progressing     Problem: Respiratory Compromise (Overdose)  Goal: Effective Oxygenation and Ventilation  Outcome: Ongoing, Progressing

## 2023-11-18 NOTE — DISCHARGE SUMMARY
[facility] Medicine Services  DISCHARGE SUMMARY    Patient Name: Fam Tao  : 1955  MRN: [id number]    Date of Admission: 2023  7:24 PM  Date of Discharge:  23  Primary Care Physician: Wilfredo Salvador MD    Consults       No orders found from 10/19/2023 to 2023.            Hospital Course     Presenting Problem: unintentional narcotic overdose.    Active Hospital Problems    Diagnosis  POA    **Narcotic overdose, accidental or unintentional, initial encounter [T40.601A]  Yes    CKD (chronic kidney disease) stage 2, GFR 60-89 ml/min [N18.2]  Yes    HLD (hyperlipidemia) [E78.5]  Yes    COPD (chronic obstructive pulmonary disease) [J44.9]  Yes    Essential hypertension [I10]  Yes    Coronary artery disease involving native coronary artery of native heart without angina pectoris [I25.10]  Yes    Gastroesophageal reflux disease without esophagitis [K21.9]  Yes    Anxiety and depression [F41.9, F32.A]  Yes    Hyperthyroidism [E05.90]  Yes    Lumbar post-laminectomy syndrome [M96.1]  Yes      Resolved Hospital Problems   No resolved problems to display.          Hospital Course:  Fam Tao is a 68 y.o. male with a past medical history significant for CAD s/p stent placement, HTN, HLD, chronic back pain s/p laminectomy with intrathecal pain pump insertion, CKD II, reformed smoker with COPD, Hyperthyroidism, and anxiety/depression. He presents with home health nurse with concerns of accidental overdose.     Home health nurse at bedside volunteers that she refilled the patient's Dilaudid pain pump. States around 0900 she filled the reservoir with 19 cc of fluid that contains 8 mg of Dilaudid per cc and 8 mg of bupivacaine per cc. Around 1200, patient called the nurse and complained of acute onset lethargy/sleepiness, dizziness, and nausea without vomiting. Nurse returned to check the function of the pain pump and noted that there was no issue with the flow rate.  However upon checking the reservoir it only had 13 cc in it. Nurse suspects that she missed the reservoir when she was filling it, dumping the 6 cc in the subcutaneous fat of his abdomen. Was concerned and brought the patient in for further evaluation and treatment.      On presentation there were no complaints of fever, cough, congestion, SOB, or chest pain. No abdominal pain, vomiting, dysuria or flank pain. No headache or focal weakness/ paresthesias. Will admit to inpatient.    Accidental Overdose  Chronic Back Pain  Intrathecal Dilaudid Pain Pump  - patient hemodynamically stable. Alert and oriented X4, GCS 15, mentation at baseline  - airway patent, no respiratory distress  - has no required Narcan, keep PRN  - ED discussed with toxicology at [facility], advise to monitor overnight  -Patient wants to go home.  He was discharged with his wife.  -They are planning to call the pain clinic and ask for a different nurse.  -CM consulted before discharge to help with resources and further guidance.      Discharge Follow Up Recommendations for outpatient labs/diagnostics:  Follow-up with pain clinic within 1 week.    Day of Discharge     HPI:   Seen and examined today.    Review of Systems  No fever, chills, chest pain, shortness of breath abdominal pain.    Vital Signs:          Physical Exam:  Physical Exam  Vitals and nursing note reviewed.   Constitutional:       Appearance: Normal appearance.   HENT:      Head: Normocephalic and atraumatic.      Mouth/Throat:      Mouth: Mucous membranes are moist.   Pulmonary:      Effort: Pulmonary effort is normal.      Breath sounds: Normal breath sounds.   Abdominal:      General: Abdomen is flat. Bowel sounds are normal.   Skin:     General: Skin is warm.   Neurological:      General: No focal deficit present.      Mental Status: He is alert and oriented to person, place, and time.   Psychiatric:         Mood and Affect: Mood normal.         Behavior: Behavior normal.           Pertinent  and/or Most Recent Results     LAB RESULTS:      Lab 11/18/23  0359 11/17/23  1856   WBC 9.58 9.94   HEMOGLOBIN 12.9* 15.4   HEMATOCRIT 39.1 46.0   PLATELETS 240 271   NEUTROS ABS 5.67 8.01*   IMMATURE GRANS (ABS) 0.05 0.05   LYMPHS ABS 2.93 1.28   MONOS ABS 0.70 0.45   EOS ABS 0.15 0.07   MCV 93.8 92.9         Lab 11/18/23  0359 11/17/23  1856   SODIUM 143 139   POTASSIUM 4.3 4.3   CHLORIDE 108* 104   CO2 27.0 24.0   ANION GAP 8.0 11.0   BUN 17 18   CREATININE 1.33* 1.39*   EGFR 58.6* 55.6*   GLUCOSE 103* 150*   CALCIUM 8.8 9.5         Lab 11/18/23  0359 11/17/23  1856   TOTAL PROTEIN 5.7* 7.3   ALBUMIN 4.1 4.8   GLOBULIN 1.6 2.5   ALT (SGPT) 26 34   AST (SGOT) 20 26   BILIRUBIN 0.3 0.3   ALK PHOS 111 144*                     Lab 11/17/23  2201   FIO2 21   CARBOXYHEMOGLOBIN (VENOUS) 1.2     Brief Urine Lab Results  (Last result in the past 365 days)        Color   Clarity   Blood   Leuk Est   Nitrite   Protein   CREAT   Urine HCG        11/17/23 2205 Yellow   Clear   Negative   Negative   Negative   Negative                 Microbiology Results (last 10 days)       ** No results found for the last 240 hours. **            No radiology results for the last 10 days                Plan for Follow-up of Pending Labs/Results:     Discharge Details        Discharge Medications        New Medications        Instructions Start Date   naloxone 4 MG/0.1ML nasal spray  Commonly known as: NARCAN   1 spray, Nasal, As Needed             Continue These Medications        Instructions Start Date   amLODIPine 5 MG tablet  Commonly known as: NORVASC   No dose, route, or frequency recorded.      aspirin 81 MG chewable tablet   81 mg, Oral, Daily      busPIRone 30 MG tablet  Commonly known as: BUSPAR   No dose, route, or frequency recorded.      carvedilol 6.25 MG tablet  Commonly known as: COREG   6.25 mg, Oral, 2 Times Daily With Meals      clopidogrel 75 MG tablet  Commonly known as: PLAVIX   75 mg, Oral, Daily       fluvoxaMINE 100 MG tablet  Commonly known as: LUVOX   No dose, route, or frequency recorded.      lisinopril 5 MG tablet  Commonly known as: PRINIVIL,ZESTRIL   5 mg, Oral, 2 Times Daily      meloxicam 15 MG tablet  Commonly known as: MOBIC   15 mg, Oral, Daily PRN      methIMAzole 10 MG tablet  Commonly known as: TAPAZOLE   10 mg, Oral, Daily      nitroglycerin 0.3 MG SL tablet  Commonly known as: NITROSTAT   0.3 mg, Sublingual, Every 5 Minutes PRN, Take no more than 3 doses in 15 minutes.       pantoprazole 40 MG EC tablet  Commonly known as: PROTONIX   No dose, route, or frequency recorded.      Repatha SureClick solution auto-injector SureClick injection  Generic drug: Evolocumab   No dose, route, or frequency recorded.               Allergies   Allergen Reactions    Penicillins Shortness Of Breath         Discharge Disposition:  Home or Self Care    Diet:  Hospital:  No active diet order      Diet Instructions    Regular Diet            Activity:      Restrictions or Other Recommendations: As tolerated.       CODE STATUS:    Code Status and Medical Interventions:   Ordered at: 11/17/23 1936     Level Of Support Discussed With:    Patient     Code Status (Patient has no pulse and is not breathing):    CPR (Attempt to Resuscitate)     Medical Interventions (Patient has pulse or is breathing):    Full Support       Future Appointments   Date Time Provider Department Center   4/18/2024  1:30 PM James Dixon MD [facility]                 Madelyn Rome MD  11/19/23      Time Spent on Discharge:  I spent  35  minutes on this discharge activity which included: face-to-face encounter with the patient, reviewing the data in the system, coordination of the care with the nursing staff as well as consultants, documentation, and entering orders.

## 2023-11-18 NOTE — OUTREACH NOTE
Prep Survey      Flowsheet Row Responses   [facility] [place] patient discharged from? [place]   Is LACE score < 7 ? Yes   Eligibility [facility]   Date of Admission 11/17/23   Date of Discharge 11/18/23   Discharge Disposition Home or Self Care  [possible  infusion services for pain pump]   Discharge diagnosis Narcotic overdose, accidental or unintentional, initial encounter   Does the patient have one of the following disease processes/diagnoses(primary or secondary)? Other   Does the patient have Home health ordered? No   Is there a DME ordered? No   Prep survey completed? Yes            Nola LARA - Registered Nurse

## 2023-11-18 NOTE — ED PROVIDER NOTES
EMERGENCY DEPARTMENT ENCOUNTER    Pt Name: Fam Tao  MRN: [id number]  Pt :   1955  Room Number:  S358/1  Date of encounter:  2023  PCP: Wilfredo Salvador MD  ED Provider: Salvador Hall MD    Historian: Patient, spouse, pain nurse      HPI:  Chief Complaint: Accidental overdose from pain pump refill        Context: Fam Tao is a 68-year-old man who presents with his pain pump nurse and spouse because of possible overdose related to a pain pump issue.  This morning he had the reservoir and his pain pump refilled.  He then felt nauseous and sleepy.  The nurse recheck to the reservoir and she says she put 19 cc of fluid in the reservoir but they were only 13 cc and she is concerned she may have missed the reservoir and leaked 6 cc into the subcutaneous tissue.  The dose on this would be both 48 mg of Dilaudid and the 6 cc and 48 mg of bupivacaine in the 6 cc.  Upon arrival here he still says he feels somewhat nauseous and his wife says he has been more sleepy today.  No other complaints at this time.      PAST MEDICAL HISTORY  Past Medical History:   Diagnosis Date    Arthritis     Coronary artery disease     Hypertension     Hypothyroidism     Sleep apnea          PAST SURGICAL HISTORY  Past Surgical History:   Procedure Laterality Date    APPENDECTOMY      CARDIAC CATHETERIZATION      3 stents    LUMBAR DISCECTOMY ANTERIOR POSTERIOR FUSION INSTRUMENTATION      Jama     PAIN PUMP INSERTION/REVISION      PAIN PUMP INSERTION/REVISION N/A 2017    Procedure: INTRARHECAL PAIN PUMP REPLACEMENT;  Surgeon: Nir Gilman MD;  Location: [facility];  Service:          FAMILY HISTORY  Family History   Problem Relation Age of Onset    Diabetes Mother     Hypertension Mother     Heart disease Mother     Heart failure Father     Diabetes Father          SOCIAL HISTORY  Social History     Socioeconomic History    Marital status:    Tobacco Use    Smoking status: Former      Packs/day: 1.50     Years: 20.00     Additional pack years: 0.00     Total pack years: 30.00     Types: Cigarettes     Quit date: 2010     Years since quittin.8    Smokeless tobacco: Never   Vaping Use    Vaping Use: Never used   Substance and Sexual Activity    Alcohol use: No    Drug use: No    Sexual activity: Yes     Partners: Female         ALLERGIES  Penicillins        REVIEW OF SYSTEMS  Review of Systems       All systems reviewed and negative except for those discussed in HPI.       PHYSICAL EXAM    I have reviewed the triage vital signs and nursing notes.    ED Triage Vitals   Temp Heart Rate Resp BP SpO2   230 23 1820 23 18223   98.3 °F (36.8 °C) 113 18 162/90 94 %      Temp src Heart Rate Source Patient Position BP Location FiO2 (%)   23 --   Oral Monitor Sitting Right arm        Physical Exam  GENERAL:   Appears in no acute distress.   HENT: Nares patent.  EYES: No scleral icterus.  Pupils 2 mm equal and reactive  CV: Regular rhythm, regular rate.  RESPIRATORY: Normal effort.  No audible wheezes, rales or rhonchi.  ABDOMEN: Soft, nontender  MUSCULOSKELETAL: No deformities.   NEURO: Alert, moves all extremities, follows commands.  SKIN: Warm, dry, no rash visualized.      LAB RESULTS  Recent Results (from the past 24 hour(s))   Comprehensive Metabolic Panel    Collection Time: 23  6:56 PM    Specimen: Blood   Result Value Ref Range    Glucose 150 (H) 65 - 99 mg/dL    BUN 18 8 - 23 mg/dL    Creatinine 1.39 (H) 0.76 - 1.27 mg/dL    Sodium 139 136 - 145 mmol/L    Potassium 4.3 3.5 - 5.2 mmol/L    Chloride 104 98 - 107 mmol/L    CO2 24.0 22.0 - 29.0 mmol/L    Calcium 9.5 8.6 - 10.5 mg/dL    Total Protein 7.3 6.0 - 8.5 g/dL    Albumin 4.8 3.5 - 5.2 g/dL    ALT (SGPT) 34 1 - 41 U/L    AST (SGOT) 26 1 - 40 U/L    Alkaline Phosphatase 144 (H) 39 - 117 U/L    Total Bilirubin 0.3 0.0 - 1.2 mg/dL     Globulin 2.5 gm/dL    A/G Ratio 1.9 g/dL    BUN/Creatinine Ratio 12.9 7.0 - 25.0    Anion Gap 11.0 5.0 - 15.0 mmol/L    eGFR 55.6 (L) >60.0 mL/min/1.73   Salicylate Level    Collection Time: 11/17/23  6:56 PM    Specimen: Blood   Result Value Ref Range    Salicylate <0.3 <=30.0 mg/dL   Ethanol    Collection Time: 11/17/23  6:56 PM    Specimen: Blood   Result Value Ref Range    Ethanol <10 0 - 10 mg/dL   Acetaminophen Level    Collection Time: 11/17/23  6:56 PM    Specimen: Blood   Result Value Ref Range    Acetaminophen <5.0 0.0 - 30.0 mcg/mL   CBC Auto Differential    Collection Time: 11/17/23  6:56 PM    Specimen: Blood   Result Value Ref Range    WBC 9.94 3.40 - 10.80 10*3/mm3    RBC 4.95 4.14 - 5.80 10*6/mm3    Hemoglobin 15.4 13.0 - 17.7 g/dL    Hematocrit 46.0 37.5 - 51.0 %    MCV 92.9 79.0 - 97.0 fL    MCH 31.1 26.6 - 33.0 pg    MCHC 33.5 31.5 - 35.7 g/dL    RDW 12.4 12.3 - 15.4 %    RDW-SD 42.6 37.0 - 54.0 fl    MPV 9.3 6.0 - 12.0 fL    Platelets 271 140 - 450 10*3/mm3    Neutrophil % 80.6 (H) 42.7 - 76.0 %    Lymphocyte % 12.9 (L) 19.6 - 45.3 %    Monocyte % 4.5 (L) 5.0 - 12.0 %    Eosinophil % 0.7 0.3 - 6.2 %    Basophil % 0.8 0.0 - 1.5 %    Immature Grans % 0.5 0.0 - 0.5 %    Neutrophils, Absolute 8.01 (H) 1.70 - 7.00 10*3/mm3    Lymphocytes, Absolute 1.28 0.70 - 3.10 10*3/mm3    Monocytes, Absolute 0.45 0.10 - 0.90 10*3/mm3    Eosinophils, Absolute 0.07 0.00 - 0.40 10*3/mm3    Basophils, Absolute 0.08 0.00 - 0.20 10*3/mm3    Immature Grans, Absolute 0.05 0.00 - 0.05 10*3/mm3    nRBC 0.0 0.0 - 0.2 /100 WBC   ECG 12 Lead Rhythm Change    Collection Time: 11/17/23  7:49 PM   Result Value Ref Range    QT Interval 356 ms    QTC Interval 449 ms   Blood Gas, Venous With Co-Ox    Collection Time: 11/17/23 10:01 PM    Specimen: Venous Blood   Result Value Ref Range    Site Nurse/Dr Draw     pH, Venous 7.360 7.310 - 7.410 pH Units    pCO2, Venous 44.3 41.0 - 51.0 mm Hg    pO2, Venous 42.9 27.0 - 53.0 mm Hg     HCO3, Venous 25.0 22.0 - 28.0 mmol/L    Base Excess, Venous -0.8 -2.0 - 2.0 mmol/L    Hemoglobin, Blood Gas 14.2 13.5 - 17.5 g/dL    Oxyhemoglobin Venous 78.0 %    Methemoglobin Venous 0.3 %    Carboxyhemoglobin Venous 1.2 %    CO2 Content 26.3 22 - 33 mmol/L    Temperature 37.0     Barometric Pressure for Blood Gas      Modality Room Air     FIO2 21 %    Rate 0 Breaths/minute    PIP 0 cmH2O    IPAP 0     EPAP 0    Urinalysis With Microscopic If Indicated (No Culture) - Urine, Clean Catch    Collection Time: 11/17/23 10:05 PM    Specimen: Urine, Clean Catch   Result Value Ref Range    Color, UA Yellow Yellow, Straw    Appearance, UA Clear Clear    pH, UA 5.5 5.0 - 8.0    Specific Gravity, UA 1.006 1.001 - 1.030    Glucose, UA Negative Negative    Ketones, UA Negative Negative    Bilirubin, UA Negative Negative    Blood, UA Negative Negative    Protein, UA Negative Negative    Leuk Esterase, UA Negative Negative    Nitrite, UA Negative Negative    Urobilinogen, UA 0.2 E.U./dL 0.2 - 1.0 E.U./dL   Urine Drug Screen - Urine, Clean Catch    Collection Time: 11/17/23 10:05 PM    Specimen: Urine, Clean Catch   Result Value Ref Range    THC, Screen, Urine Negative Negative    Phencyclidine (PCP), Urine Negative Negative    Cocaine Screen, Urine Negative Negative    Methamphetamine, Ur Negative Negative    Opiate Screen Positive (A) Negative    Amphetamine Screen, Urine Negative Negative    Benzodiazepine Screen, Urine Negative Negative    Tricyclic Antidepressants Screen Negative Negative    Methadone Screen, Urine Negative Negative    Barbiturates Screen, Urine Negative Negative    Oxycodone Screen, Urine Negative Negative    Buprenorphine, Screen, Urine Negative Negative   Fentanyl, Urine - Urine, Clean Catch    Collection Time: 11/17/23 10:05 PM    Specimen: Urine, Clean Catch   Result Value Ref Range    Fentanyl, Urine Negative Negative       If labs were ordered, I independently reviewed the results and considered them  in treating the patient.        RADIOLOGY  No Radiology Exams Resulted Within Past 24 Hours    I ordered and independently reviewed the above noted radiographic studies.        See radiologist's dictation for official interpretation.        PROCEDURES    Procedures    ECG 12 Lead Rhythm Change   Preliminary Result   Test Reason : Rhythm Change   Blood Pressure :   */*   mmHG   Vent. Rate :  96 BPM     Atrial Rate :  96 BPM      P-R Int : 124 ms          QRS Dur :  84 ms       QT Int : 356 ms       P-R-T Axes :  64 -42  39 degrees      QTc Int : 449 ms      Normal sinus rhythm   Left axis deviation   Cannot rule out Anterior infarct , age undetermined   Abnormal ECG   When compared with ECG of 19-JAN-2017 12:43,   Minimal criteria for Anterior infarct are now present      Referred By: EDMD           Confirmed By:           MEDICATIONS GIVEN IN ER    Medications   naloxone (NARCAN) injection 0.2 mg (has no administration in time range)   pantoprazole (PROTONIX) EC tablet 40 mg (has no administration in time range)   lisinopril (PRINIVIL,ZESTRIL) tablet 5 mg (5 mg Oral Given 11/17/23 2148)   aspirin chewable tablet 81 mg (has no administration in time range)   amLODIPine (NORVASC) tablet 5 mg (has no administration in time range)   methIMAzole (TAPAZOLE) tablet 10 mg (has no administration in time range)   carvedilol (COREG) tablet 6.25 mg (6.25 mg Oral Given 11/17/23 2148)   clopidogrel (PLAVIX) tablet 75 mg (has no administration in time range)   fluvoxaMINE (LUVOX) tablet 100 mg (100 mg Oral Given 11/17/23 2148)   busPIRone (BUSPAR) tablet 30 mg (30 mg Oral Given 11/17/23 2148)   sodium chloride 0.9 % flush 10 mL (10 mL Intravenous Given 11/17/23 2149)   sodium chloride 0.9 % flush 10 mL (has no administration in time range)   sodium chloride 0.9 % infusion 40 mL (has no administration in time range)   sennosides-docusate (PERICOLACE) 8.6-50 MG per tablet 2 tablet (2 tablets Oral Not Given 11/17/23 2214)     And    polyethylene glycol (MIRALAX) packet 17 g (has no administration in time range)     And   bisacodyl (DULCOLAX) EC tablet 5 mg (has no administration in time range)     And   bisacodyl (DULCOLAX) suppository 10 mg (has no administration in time range)   heparin (porcine) 5000 UNIT/ML injection 5,000 Units (5,000 Units Subcutaneous Given 11/17/23 2149)   acetaminophen (TYLENOL) tablet 650 mg (has no administration in time range)   melatonin tablet 5 mg (has no administration in time range)   ondansetron (ZOFRAN) injection 4 mg (has no administration in time range)   ondansetron (ZOFRAN) injection 4 mg (4 mg Intravenous Given 11/17/23 1946)   sodium chloride 0.9 % bolus 1,000 mL ( Intravenous Currently Infusing 11/17/23 2035)         MEDICAL DECISION MAKING, PROGRESS, and CONSULTS    All labs, if obtained, have been independently reviewed by me.  All radiology studies, if obtained, have been reviewed by me and the radiologist dictating the report.  All EKG's, if obtained, have been independently viewed and interpreted by me/my attending physician.      Discussion below represents my analysis of pertinent findings related to patient's condition, differential diagnosis, treatment plan and final disposition.                         Differential diagnosis:    Respiratory depression, opioid overdose, bupivacaine overdose, polypharmacy, anemia, electrolyte abnormality, acidosis      Additional sources:    - Discussed/ obtained information from independent historians: Wife and pain nurse    - External (non-ED) record review: Outpatient notes for hypothyroidism, lumbar radiculopathy with chronic pain, obstructive sleep apnea, obsessive-compulsive disorder    - Chronic or social conditions impacting care: Chronic pain on Dilaudid pump    - Shared decision making: Agreeable to hospital admission for observation      Orders placed during this visit:  Orders Placed This Encounter   Procedures    Comprehensive Metabolic Panel     Urinalysis With Microscopic If Indicated (No Culture) - Urine, Clean Catch    Salicylate Level    Urine Drug Screen - Urine, Clean Catch    Ethanol    Acetaminophen Level    Blood Gas, Venous -With Co-Ox Panel: Yes    CBC Auto Differential    Comprehensive Metabolic Panel    CBC Auto Differential    Blood Gas, Venous With Co-Ox    Fentanyl, Urine - Urine, Clean Catch    Diet: Regular/House Diet; Texture: Regular Texture (IDDSI 7); Fluid Consistency: Thin (IDDSI 0)    Pulse Oximetry, Continuous    Vital Signs    Intake & Output    Weigh Patient    Oral Care    Saline Lock & Maintain IV Access    Neuro Checks    Code Status and Medical Interventions:    ECG 12 Lead Rhythm Change    Insert Peripheral IV    Initiate Observation Status    ED IP Bed Request    CBC & Differential         Additional orders considered but not ordered:      ED Course:    Consultants:      ED Course as of 11/18/23 0045   Fri Nov 17, 2023   1844 In summary this is a 68-year-old man who presents with his pain pump nurse and spouse because of possible overdose related to a pain pump issue.  This morning he had the reservoir and his pain pump refilled.  He then felt nauseous and sleepy.  The nurse recheck to the reservoir and she says she put 19 cc of fluid in the reservoir but they were only 13 cc and she is concerned she may have missed the reservoir and leaked 6 cc into the subcutaneous tissue.  The dose on this would be both 48 mg of Dilaudid and the 6 cc and 48 mg of bupivacaine in the 6 cc.  Upon arrival here he still says he feels somewhat nauseous and his wife says he has been more sleepy today.  No other complaints at this time. [CC]   Sat Nov 18, 2023   0043 He arrived awake and alert mildly tachycardic and mildly hypertensive otherwise vitals are within normal limits he is satting well on room air without apparent respiratory depression.  Conveniently he is accompanied by his pain pump nurse who provides an excellent history it sounds  like our concern is possible Depo injection of up to 40 mg of Dilaudid.  Patient does have 2 mm pupils unclear whether this is his baseline respiratory rate seems good.  Placed on oxygen monitoring.  CBC reassuring and nonactionable.  Metabolic panel shows elevated creatinine consistent with his CKD but no other actionable items.  Toxicologic screening only positive for opiates.  I have contacted poison control and because of the complexity of the pharmacokinetics of this contacted the on-call  at [facility] who feels like he should already be showing symptoms but recommends hospitalization overnight for monitoring if he does not require Narcan he says he should be appropriate for discharge by 6 AM.  Patient is agreeable to this plan.  Admitted in stable condition. [CC]      ED Course User Index  [CC] Salvador Hall MD              Shared Decision Making:  After my consideration of clinical presentation and any laboratory/radiology studies obtained, I discussed the findings with the patient/patient representative who is in agreement with the treatment plan and the final disposition.   Risks and benefits of discharge and/or observation/admission were discussed.       AS OF 00:45 EST VITALS:    BP - 129/60  HR - 93  TEMP - 97.5 °F (36.4 °C) (Oral)  O2 SATS - 95%                  DIAGNOSIS  Final diagnoses:   Narcotic overdose, accidental or unintentional, initial encounter   Nausea   CKD (chronic kidney disease) stage 2, GFR 60-89 ml/min   Lumbar post-laminectomy syndrome         DISPOSITION  Admit      Please note that portions of this document were completed with voice recognition software.        Salvador Hall MD  11/18/23 0047

## 2023-11-19 LAB
QT INTERVAL: 356 MS
QTC INTERVAL: 449 MS

## 2023-11-20 ENCOUNTER — TRANSITIONAL CARE MANAGEMENT TELEPHONE ENCOUNTER (OUTPATIENT)
Dept: CALL CENTER | Facility: HOSPITAL | Age: 68
End: 2023-11-20
Payer: MEDICARE

## 2023-11-20 NOTE — OUTREACH NOTE
Call Center TCM Note      Flowsheet Row Responses   [facility] patient discharged from? [place]   Does the patient have one of the following disease processes/diagnoses(primary or secondary)? Other   TCM attempt successful? Yes   Call start time 1333   Call end time 1337   Discharge diagnosis Narcotic overdose, accidental or unintentional, initial encounter   Person spoke with today (if not patient) and relationship Patient   Meds reviewed with patient/caregiver? Yes  [New: narcan nasal spray]   Does the patient have all medications ordered at discharge? Yes   Is the patient taking all medications as directed (includes completed medication regime)? Yes   Comments PCP Dr Salvador. Patient declined need to schedule PCP HFU appt with call today. He denies any needs from primary care at this time.   Does the patient have an appointment with their PCP within 7-14 days of discharge? No   Nursing Interventions Patient declined scheduling/rescheduling appointment at this time, Routed TCM call to PCP office   Has home health visited the patient within 72 hours of discharge? N/A   Psychosocial issues? No   Did the patient receive a copy of their discharge instructions? Yes   Nursing interventions Reviewed instructions with patient   What is the patient's perception of their health status since discharge? Improving   Is the patient/caregiver able to teach back signs and symptoms related to disease process for when to call PCP? Yes   Is the patient/caregiver able to teach back signs and symptoms related to disease process for when to call 911? Yes   Is the patient/caregiver able to teach back the hierarchy of who to call/visit for symptoms/problems? PCP, Specialist, Home health nurse, Urgent Care, ED, 911 Yes   If the patient is a current smoker, are they able to teach back resources for cessation? Not a smoker   TCM call completed? Yes   Call end time 1337   Would this patient benefit from a Referral to [facility] Social Work?  No   Is the patient interested in additional calls from an ambulatory ? No            Anna Chakraborty RN    11/20/2023, 13:39 EST

## 2023-11-21 ENCOUNTER — HOSPITAL ENCOUNTER (OUTPATIENT)
Age: 68
End: 2023-11-21
Payer: MEDICARE

## 2023-11-21 DIAGNOSIS — G47.33: Primary | ICD-10-CM

## 2023-11-21 DIAGNOSIS — G47.31: ICD-10-CM

## 2023-11-21 PROCEDURE — 95811 POLYSOM 6/>YRS CPAP 4/> PARM: CPT

## 2023-12-18 RX ORDER — METHIMAZOLE 10 MG/1
10 TABLET ORAL DAILY
Qty: 90 TABLET | Refills: 1 | Status: SHIPPED | OUTPATIENT
Start: 2023-12-18

## 2023-12-18 NOTE — TELEPHONE ENCOUNTER
Caller: LUCY ALVARADO     Relationship: SELF     Best call back number: [phone]    Requested Prescriptions: methIMAzole (TAPAZOLE) 10 MG tablet  #90  Requested Prescriptions      No prescriptions requested or ordered in this encounter        Pharmacy where request should be sent:[facility]       Last office visit with prescribing clinician: 10/19/2023   Last telemedicine visit with prescribing clinician: Visit date not found   Next office visit with prescribing clinician: 4/18/2024     Additional details provided by patient:     Does the patient have less than a 3 day supply:  [] Yes  [x] No    Would you like a call back once the refill request has been completed: [x] Yes [] No    If the office needs to give you a call back, can they leave a voicemail: [x] Yes [] No    Dinora Stratton Rep   12/18/23 10:10 EST

## 2024-04-18 ENCOUNTER — OFFICE VISIT (OUTPATIENT)
Dept: ENDOCRINOLOGY | Facility: CLINIC | Age: 69
End: 2024-04-18
Payer: MEDICARE

## 2024-04-18 VITALS
DIASTOLIC BLOOD PRESSURE: 62 MMHG | SYSTOLIC BLOOD PRESSURE: 128 MMHG | BODY MASS INDEX: 26.46 KG/M2 | OXYGEN SATURATION: 96 % | HEIGHT: 71 IN | HEART RATE: 74 BPM | WEIGHT: 189 LBS

## 2024-04-18 DIAGNOSIS — E05.90 HYPERTHYROIDISM: Primary | ICD-10-CM

## 2024-04-18 PROCEDURE — 84439 ASSAY OF FREE THYROXINE: CPT | Performed by: INTERNAL MEDICINE

## 2024-04-18 PROCEDURE — 99213 OFFICE O/P EST LOW 20 MIN: CPT | Performed by: INTERNAL MEDICINE

## 2024-04-18 PROCEDURE — 3078F DIAST BP <80 MM HG: CPT | Performed by: INTERNAL MEDICINE

## 2024-04-18 PROCEDURE — 84443 ASSAY THYROID STIM HORMONE: CPT | Performed by: INTERNAL MEDICINE

## 2024-04-18 PROCEDURE — 1160F RVW MEDS BY RX/DR IN RCRD: CPT | Performed by: INTERNAL MEDICINE

## 2024-04-18 PROCEDURE — 1159F MED LIST DOCD IN RCRD: CPT | Performed by: INTERNAL MEDICINE

## 2024-04-18 PROCEDURE — 3074F SYST BP LT 130 MM HG: CPT | Performed by: INTERNAL MEDICINE

## 2024-04-18 PROCEDURE — 36415 COLL VENOUS BLD VENIPUNCTURE: CPT | Performed by: INTERNAL MEDICINE

## 2024-04-18 NOTE — PROGRESS NOTES
"     Office Note      Date: 2024  Patient Name: Fam Tao  MRN: [id number]  : 1955    Chief Complaint   Patient presents with    Hyperthyroidism       History of Present Illness:   Fam Tao is a 68 y.o. male who presents for Hyperthyroidism  .   Current rx:methimazole     Changes in history:pain pump overdosed him so he was in the hospital with that   Questions /problems:none     Subjective          Review of Systems:   Review of Systems   Constitutional:  Negative for unexpected weight change.   Cardiovascular:  Negative for palpitations.   Endocrine: Negative for cold intolerance and heat intolerance.   Neurological:  Negative for tremors.       The following portions of the patient's history were reviewed and updated as appropriate: allergies, current medications, past family history, past medical history, past social history, past surgical history, and problem list.    Objective     Visit Vitals  /62   Pulse 74   Ht 180.3 cm (71\")   Wt 85.7 kg (189 lb)   SpO2 96%   BMI 26.36 kg/m²           Physical Exam:  Physical Exam  Eyes:      Extraocular Movements: Extraocular movements intact.   Neck:      Comments: No palpable thyroid abnormality   Cardiovascular:      Rate and Rhythm: Normal rate.   Pulmonary:      Effort: Pulmonary effort is normal.   Lymphadenopathy:      Cervical: No cervical adenopathy.   Neurological:      Comments: No tremor   Psychiatric:         Mood and Affect: Mood normal.         Thought Content: Thought content normal.         Judgment: Judgment normal.       Assessment / Plan      Assessment & Plan:  Problem List Items Addressed This Visit       Hyperthyroidism - Primary    Current Assessment & Plan     Stable  Clinically euthyroid. Thyroid levels ordered. Medication to be adjusted accordingly.          Relevant Medications    carvedilol (COREG) 6.25 MG tablet    methIMAzole (TAPAZOLE) 10 MG tablet    Other Relevant Orders    TSH    T4, Free    "     Electronically signed by : James Dixon MD   04/18/2024

## 2024-04-19 LAB
T4 FREE SERPL-MCNC: 0.96 NG/DL (ref 0.93–1.7)
TSH SERPL DL<=0.05 MIU/L-ACNC: 1.74 UIU/ML (ref 0.27–4.2)

## 2024-04-19 RX ORDER — METHIMAZOLE 10 MG/1
10 TABLET ORAL DAILY
Qty: 90 TABLET | Refills: 3 | Status: SHIPPED | OUTPATIENT
Start: 2024-04-19

## 2024-06-10 ENCOUNTER — TELEPHONE (OUTPATIENT)
Dept: ENDOCRINOLOGY | Facility: CLINIC | Age: 69
End: 2024-06-10

## 2024-06-10 NOTE — TELEPHONE ENCOUNTER
Caller: Fam Tao    Relationship: Self    Best call back number: [phone]    Requested Prescriptions:     methIMAzole (TAPAZOLE) 10 MG tablet     Requested Prescriptions      No prescriptions requested or ordered in this encounter        Pharmacy where request should be sent: [facility]- [address] [place] [address]     Last office visit with prescribing clinician: 4/18/2024   Last telemedicine visit with prescribing clinician: Visit date not found   Next office visit with prescribing clinician: 10/18/2024     Additional details provided by patient: PT NEEDS A REFILL ON THIS MEDICATION AND FOR A 90 DAY SUPPLY.     Does the patient have less than a 3 day supply:  [] Yes  [x] No    Would you like a call back once the refill request has been completed: [x] Yes [] No    If the office needs to give you a call back, can they leave a voicemail: [x] Yes [] No    Dinora Palomares Rep   06/10/24 15:39 EDT

## 2024-06-10 NOTE — TELEPHONE ENCOUNTER
Reached out to pt to let him know Dr. Dixon sent in a 90 day supply with 3 refills to his pharmacy in April. He is going to reach out to them and if there is any issues he will call back.

## 2024-10-03 ENCOUNTER — TELEPHONE (OUTPATIENT)
Dept: FAMILY MEDICINE CLINIC | Facility: CLINIC | Age: 69
End: 2024-10-03

## 2024-10-03 ENCOUNTER — OFFICE VISIT (OUTPATIENT)
Dept: FAMILY MEDICINE CLINIC | Facility: CLINIC | Age: 69
End: 2024-10-03
Payer: MEDICARE

## 2024-10-03 VITALS
SYSTOLIC BLOOD PRESSURE: 112 MMHG | BODY MASS INDEX: 26.74 KG/M2 | OXYGEN SATURATION: 97 % | WEIGHT: 191 LBS | HEART RATE: 76 BPM | TEMPERATURE: 98 F | RESPIRATION RATE: 16 BRPM | DIASTOLIC BLOOD PRESSURE: 70 MMHG | HEIGHT: 71 IN

## 2024-10-03 DIAGNOSIS — Z87.891 PERSONAL HISTORY OF NICOTINE DEPENDENCE: ICD-10-CM

## 2024-10-03 DIAGNOSIS — H61.23 BILATERAL IMPACTED CERUMEN: ICD-10-CM

## 2024-10-03 DIAGNOSIS — Z12.5 PROSTATE CANCER SCREENING: ICD-10-CM

## 2024-10-03 DIAGNOSIS — E05.90 HYPERTHYROIDISM: Primary | ICD-10-CM

## 2024-10-03 DIAGNOSIS — Z12.2 SCREENING FOR LUNG CANCER: ICD-10-CM

## 2024-10-03 DIAGNOSIS — Z13.6 ENCOUNTER FOR ABDOMINAL AORTIC ANEURYSM (AAA) SCREENING: ICD-10-CM

## 2024-10-03 DIAGNOSIS — Z87.891 HISTORY OF NICOTINE DEPENDENCE: ICD-10-CM

## 2024-10-03 PROCEDURE — 1160F RVW MEDS BY RX/DR IN RCRD: CPT | Performed by: FAMILY MEDICINE

## 2024-10-03 PROCEDURE — 1126F AMNT PAIN NOTED NONE PRSNT: CPT | Performed by: FAMILY MEDICINE

## 2024-10-03 PROCEDURE — 3074F SYST BP LT 130 MM HG: CPT | Performed by: FAMILY MEDICINE

## 2024-10-03 PROCEDURE — 3078F DIAST BP <80 MM HG: CPT | Performed by: FAMILY MEDICINE

## 2024-10-03 PROCEDURE — 99214 OFFICE O/P EST MOD 30 MIN: CPT | Performed by: FAMILY MEDICINE

## 2024-10-03 PROCEDURE — 1159F MED LIST DOCD IN RCRD: CPT | Performed by: FAMILY MEDICINE

## 2024-10-03 NOTE — PROGRESS NOTES
"Chief Complaint  Discuss thyroid (Wanting you to take over treating thyroid instead of Dr Dixon. His Daughter suggested this. ) and Earache (Left ear stopped up x 2wks. )    Subjective          Fam Tao presents to [facility]    HPI         The patient is a 69-year-old male here for a follow-up visit.    He reports feeling well overall, with no current health concerns. His visit today is primarily for the management of his thyroid condition. He has been under the care of Dr. Dixon in [place] for this issue and is currently on methimazole for an overactive thyroid, which he believes is effectively managing his condition. He reports no symptoms such as shakiness, nervousness, or heart palpitations. He is not experiencing any chest pain. His weight has remained stable, although he expresses a desire to lose some weight. However, he is unable to exercise due to back issues.    He has undergone spinal fusions and had an ablation procedure about a month ago. He is also under the care of a cardiologist, Dr. Sung Mosley. He recalls having a heart attack in the past, which led to the placement of three stents. He is currently on a blood thinner medication. His cholesterol levels are monitored by Dr. Day.    He quit smoking in 2010 after a long history of heavy smoking. He mentions that his blood work is checked every six months. He recently had a CT scan and underwent surgery on 05/31/2024 at [facility], performed by Dr. Rex Hogan. He underwent a Cologuard test approximately a year ago. He is unsure if he has ever had an abdominal ultrasound.       OTHER NOTES:          Review of Systems   Constitutional: Negative.    Respiratory: Negative.     Cardiovascular: Negative.    Gastrointestinal: Negative.    All other systems reviewed and are negative.       Objective       Vital Signs:   /70   Pulse 76   Temp 98 °F (36.7 °C)   Resp 16   Ht 180.3 cm (71\")   Wt " 86.6 kg (191 lb)   SpO2 97%   BMI 26.64 kg/m²     Physical Exam  Vitals and nursing note reviewed.   Constitutional:       General: He is not in acute distress.     Appearance: Normal appearance. He is well-developed. He is not ill-appearing.   HENT:      Head: Normocephalic and atraumatic.      Right Ear: Ear canal and external ear normal. Decreased hearing noted. There is impacted cerumen.      Left Ear: Ear canal and external ear normal. Decreased hearing noted. There is impacted cerumen.      Nose: Nose normal. No congestion or rhinorrhea.      Mouth/Throat:      Mouth: Mucous membranes are moist.      Pharynx: No oropharyngeal exudate or posterior oropharyngeal erythema.   Eyes:      General: Lids are normal.      Conjunctiva/sclera: Conjunctivae normal.      Pupils: Pupils are equal, round, and reactive to light.   Neck:      Thyroid: No thyromegaly.   Cardiovascular:      Rate and Rhythm: Normal rate and regular rhythm.      Heart sounds: Normal heart sounds. No murmur heard.     No friction rub.   Pulmonary:      Effort: Pulmonary effort is normal. No respiratory distress.      Breath sounds: Normal breath sounds. No wheezing or rales.   Abdominal:      General: Bowel sounds are normal. There is no distension.      Palpations: Abdomen is soft. There is no mass.      Tenderness: There is no abdominal tenderness. There is no guarding or rebound.   Musculoskeletal:      Right lower leg: No edema.      Left lower leg: No edema.   Skin:     General: Skin is warm and dry.   Neurological:      Mental Status: He is alert.   Psychiatric:         Mood and Affect: Mood normal.         Speech: Speech normal.         Behavior: Behavior normal.             Clear lungs.       Result Review :            Other Results    Results  Laboratory Studies  Thyroid level was normal in April.             Assessment and Plan    Diagnoses and all orders for this visit:    1. Hyperthyroidism (Primary)  -     TSH  -     T4, Free    2.  Encounter for abdominal aortic aneurysm (AAA) screening  -     [place] aaa screen limited; Future    3. History of nicotine dependence  -     [place] aaa screen limited; Future    4. Screening for lung cancer  -     CT Chest Low Dose Wo; Future    5. Personal history of nicotine dependence  -     CT Chest Low Dose Wo; Future    6. Prostate cancer screening  -     PSA Screen    7. Bilateral impacted cerumen               DISCUSSION       1. Hyperthyroidism.  His thyroid function appears to be well-controlled on methimazole. He reports no symptoms of shakiness, nervousness, heart racing, or chest pain. Blood work will be ordered to assess thyroid function.  He would like for us to continue the medication.  I explained that we would need to refer him back to endocrinology if anything became abnormal and he expressed understanding.    2. Back Pain.  He reports a history of spinal fusions and a recent ablation about a month ago. He is advised to continue following up with his current specialists for management.    3. Health Maintenance.  A colon cancer screening will be conducted as it has been more than three years since his last Cologuard test. A low-dose CT scan of the lungs is recommended due to his history of smoking, which he quit in 2010. An abdominal ultrasound will be performed to check for an aneurysm. Blood work will be ordered to assess prostate-specific antigen (PSA) levels.    4. Ear Wax Impaction.  He is advised to instill 3 to 4 drops of hydrogen peroxide in his ears nightly before bedtime to soften the wax. If there is no improvement in his ear condition, an ear flushing procedure may be necessary.             Follow Up   Return in about 6 months (around 4/3/2025).    Patient was given instructions and counseling regarding his condition or for health maintenance advice. Please see specific information pulled into the AVS if appropriate.       Wilfredo Salvador MD    Patient or patient representative verbalized  consent for the use of Ambient Listening during the visit with  Wilfredo Salvador MD for chart documentation. 10/3/2024  12:46 EDT

## 2024-10-04 ENCOUNTER — TELEPHONE (OUTPATIENT)
Dept: FAMILY MEDICINE CLINIC | Facility: CLINIC | Age: 69
End: 2024-10-04
Payer: MEDICARE

## 2024-10-04 DIAGNOSIS — Z12.11 COLON CANCER SCREENING: Primary | ICD-10-CM

## 2024-10-04 LAB
PSA SERPL-MCNC: 0.5 NG/ML (ref 0–4)
T4 FREE SERPL-MCNC: 1.03 NG/DL (ref 0.82–1.77)
TSH SERPL DL<=0.005 MIU/L-ACNC: 2.05 UIU/ML (ref 0.45–4.5)

## 2024-10-04 NOTE — TELEPHONE ENCOUNTER
PT CALLED AND REPORTED ON HIS AFTER VISIT SUMMARY THERE WAS SOME INCORRECT INFORMATION THAT HE WOULD LIKED UPDATED.   1.) DR. FRANCIS IS THE CARDIOLOGIST    CHOLESTEROL IS MONITORED BY DR FRANCIS

## 2024-12-09 ENCOUNTER — HOSPITAL ENCOUNTER (OUTPATIENT)
Dept: HOSPITAL 22 - LAB | Age: 69
Discharge: HOME | End: 2024-12-09
Payer: MEDICARE

## 2024-12-09 DIAGNOSIS — N18.32: Primary | ICD-10-CM

## 2024-12-09 DIAGNOSIS — J44.9: ICD-10-CM

## 2024-12-09 DIAGNOSIS — I10: ICD-10-CM

## 2024-12-09 DIAGNOSIS — E78.49: ICD-10-CM

## 2024-12-09 DIAGNOSIS — G47.33: ICD-10-CM

## 2024-12-09 DIAGNOSIS — R00.2: ICD-10-CM

## 2024-12-09 DIAGNOSIS — I25.10: ICD-10-CM

## 2024-12-09 LAB
ALBUMIN LEVEL: 4.4 G/DL (ref 3.5–5)
ALP ISO SERPL-ACNC: 119 U/L (ref 38–126)
ALT SERPLBLD-CCNC: 36 U/L (ref 12–78)
ANION GAP SERPL CALC-SCNC: 13.4 MEQ/L (ref 5–15)
AST SERPL QL: 33 U/L (ref 17–59)
BILIRUB DIRECT SERPL-MCNC: 0.3 MG/DL (ref 0–0.4)
BILIRUB INDIRECT SERPL-MCNC: 0 MG/DL (ref 0–1.1)
BILIRUB INDIRECT SERPL-MCNC: 0.1 MG/DL (ref 0–0.9)
BILIRUBIN,TOTAL: 0.4 MG/DL (ref 0.2–1.3)
BUN SERPL-MCNC: 20 MG/DL (ref 9–20)
CALCIUM SPEC-MCNC: 9.3 MG/DL (ref 8.4–10.2)
CHLORIDE SPEC-SCNC: 107 MMOL/L (ref 98–107)
CHOLEST SPEC-SCNC: 120 MG/DL (ref 140–200)
CO2 SERPL-SCNC: 25 MMOL/L (ref 22–30)
CREAT BLD-SCNC: 1.4 MG/DL (ref 0.66–1.25)
ESTIMATED GLOMERULAR FILT RATE: 50 ML/MIN (ref 60–?)
GFR (AFRICAN AMERICAN): 61 ML/MIN (ref 60–?)
GLUCOSE: 98 MG/DL (ref 74–100)
HCT VFR BLD CALC: 45.4 % (ref 42–52)
HDLC SERPL-MCNC: 58 MG/DL (ref 40–60)
HGB BLD-MCNC: 15.3 G/DL (ref 14.1–18)
MAGNESIUM: 2 MG/DL (ref 1.6–2.3)
MCHC RBC-ENTMCNC: 33.7 G/DL (ref 31.8–35.4)
MCV RBC: 92.5 FL (ref 80–94)
MEAN CORPUSCULAR HEMOGLOBIN: 31.2 PG (ref 27–31.2)
PLATELET # BLD: 277 K/MM3 (ref 142–424)
POTASSIUM: 4.4 MMOL/L (ref 3.5–5.1)
PROT SERPL-MCNC: 6.7 G/DL (ref 6.3–8.2)
RBC # BLD AUTO: 4.91 M/MM3 (ref 4.6–6.2)
SODIUM SPEC-SCNC: 141 MMOL/L (ref 136–145)
T4 FREE SERPL-MCNC: 0.9 NG/DL (ref 0.78–2.19)
TRIGLYCERIDES: 138 MG/DL (ref 30–150)
TSH SERPL-ACNC: 2.12 UIU/ML (ref 0.47–4.68)
WBC # BLD AUTO: 9.7 K/MM3 (ref 4.8–10.8)

## 2024-12-09 PROCEDURE — 80048 BASIC METABOLIC PNL TOTAL CA: CPT

## 2024-12-09 PROCEDURE — 93227 XTRNL ECG REC<48 HR R&I: CPT

## 2024-12-09 PROCEDURE — 84443 ASSAY THYROID STIM HORMONE: CPT

## 2024-12-09 PROCEDURE — 84439 ASSAY OF FREE THYROXINE: CPT

## 2024-12-09 PROCEDURE — 80076 HEPATIC FUNCTION PANEL: CPT

## 2024-12-09 PROCEDURE — 85025 COMPLETE CBC W/AUTO DIFF WBC: CPT

## 2024-12-09 PROCEDURE — 36415 COLL VENOUS BLD VENIPUNCTURE: CPT

## 2024-12-09 PROCEDURE — 93225 XTRNL ECG REC<48 HRS REC: CPT

## 2024-12-09 PROCEDURE — 83735 ASSAY OF MAGNESIUM: CPT

## 2024-12-09 PROCEDURE — 80061 LIPID PANEL: CPT

## 2025-04-08 ENCOUNTER — OFFICE VISIT (OUTPATIENT)
Dept: FAMILY MEDICINE CLINIC | Facility: CLINIC | Age: 70
End: 2025-04-08
Payer: MEDICARE

## 2025-04-08 VITALS
HEART RATE: 70 BPM | BODY MASS INDEX: 27.16 KG/M2 | OXYGEN SATURATION: 93 % | RESPIRATION RATE: 18 BRPM | SYSTOLIC BLOOD PRESSURE: 100 MMHG | TEMPERATURE: 97.8 F | DIASTOLIC BLOOD PRESSURE: 58 MMHG | HEIGHT: 71 IN | WEIGHT: 194 LBS

## 2025-04-08 DIAGNOSIS — H61.23 BILATERAL IMPACTED CERUMEN: ICD-10-CM

## 2025-04-08 DIAGNOSIS — M25.511 CHRONIC RIGHT SHOULDER PAIN: Primary | ICD-10-CM

## 2025-04-08 DIAGNOSIS — E05.90 HYPERTHYROIDISM: ICD-10-CM

## 2025-04-08 DIAGNOSIS — G89.29 CHRONIC RIGHT SHOULDER PAIN: Primary | ICD-10-CM

## 2025-04-08 DIAGNOSIS — I10 ESSENTIAL HYPERTENSION: ICD-10-CM

## 2025-04-08 RX ORDER — NITROGLYCERIN 0.4 MG/1
TABLET SUBLINGUAL
COMMUNITY
Start: 2025-01-27

## 2025-04-08 RX ORDER — METHIMAZOLE 10 MG/1
10 TABLET ORAL DAILY
Qty: 90 TABLET | Refills: 3 | Status: SHIPPED | OUTPATIENT
Start: 2025-04-08

## 2025-04-08 RX ORDER — CARVEDILOL 25 MG/1
1 TABLET ORAL EVERY 12 HOURS SCHEDULED
COMMUNITY
Start: 2025-01-22

## 2025-04-08 RX ORDER — BACLOFEN 10 MG/1
TABLET ORAL
COMMUNITY

## 2025-04-08 NOTE — PROGRESS NOTES
Chief Complaint  Hypothyroidism (6 mo f/up )    Subjective          Fam Tao presents to [facility]    HPI         The patient is a 69-year-old male who presents for follow-up.    He reports persistent right shoulder discomfort, exacerbated by certain movements, particularly in the evening. The pain is intermittent without associated numbness or tingling. He is not taking any medication for this condition.    He was last seen in October 2024 for his thyroid. He continues methimazole and requires a refill.    He is under Dr. Sung Parra care for cardiac health. Diagnosed with Premature Atrial Contractions (PACs), he experiences palpitations throughout the day, without dizziness or syncope, but reports fatigue and a sensation of hard heartbeats. He is on antihypertensive medication.    He recently acquired new hearing aids and suspects a right ear infection. He no longer consults Dr. Dixon.    He was unable to undergo ultrasound and lung tests due to insurance issues. He quit smoking in 2010. Blood work was done on 12/09/2024. He has noticed elevated kidney function since his heart attack. He underwent cataract surgery on 03/06/2025 and 03/07/2025 and only needs glasses for reading.    SOCIAL HISTORY  - Quit smoking in 2010    MEDICATIONS  - Methimazole       OTHER NOTES:          Review of Systems   Constitutional:  Negative for chills, diaphoresis, fatigue and fever.   HENT:  Negative for congestion, sore throat and swollen glands.    Respiratory:  Negative for cough.    Cardiovascular:  Negative for chest pain.   Gastrointestinal:  Negative for abdominal pain, nausea and vomiting.   Genitourinary:  Negative for dysuria.   Musculoskeletal:  Negative for myalgias and neck pain.   Skin:  Negative for rash.   Neurological:  Negative for weakness and numbness.        Objective       Vital Signs:   /58   Pulse 70   Temp 97.8 °F (36.6 °C)   Resp 18   Ht 180.3 cm  "(71\")   Wt 88 kg (194 lb)   SpO2 93%   BMI 27.06 kg/m²     Physical Exam  Vitals and nursing note reviewed.   Constitutional:       General: He is not in acute distress.     Appearance: Normal appearance. He is well-developed. He is not ill-appearing.   HENT:      Head: Normocephalic and atraumatic.      Right Ear: External ear normal. There is impacted cerumen.      Left Ear: External ear normal. There is impacted cerumen.      Mouth/Throat:      Mouth: Mucous membranes are moist.      Pharynx: No oropharyngeal exudate or posterior oropharyngeal erythema.   Neck:      Thyroid: No thyroid mass, thyromegaly or thyroid tenderness.   Cardiovascular:      Rate and Rhythm: Normal rate and regular rhythm.      Heart sounds: Normal heart sounds.   Pulmonary:      Effort: Pulmonary effort is normal. No respiratory distress.      Breath sounds: Normal breath sounds. No wheezing or rales.   Musculoskeletal:      Right lower leg: No edema.      Left lower leg: No edema.   Skin:     General: Skin is warm and dry.   Neurological:      Mental Status: He is alert.   Psychiatric:         Mood and Affect: Mood normal.         Behavior: Behavior normal.           Large amount of wax in both ears.  Wax appears hard  Lungs clear.  Heart rhythm regular.  No swelling noted.       Result Review :            Other Results    Results  - Laboratory Studies:    - Cholesterol levels: good    - PSA: normal    - Thyroid: normal    - Imaging:    - Right shoulder x-ray: normal             Assessment and Plan    Diagnoses and all orders for this visit:    1. Chronic right shoulder pain (Primary)    2. Hyperthyroidism  -     TSH  -     T4, Free  -     methIMAzole (TAPAZOLE) 10 MG tablet; Take 1 tablet by mouth Daily.  Dispense: 90 tablet; Refill: 3    3. Essential hypertension  -     Comprehensive Metabolic Panel    4. Bilateral impacted cerumen               DISCUSSION       Right shoulder pain.  X-ray results from 2023 unremarkable. Pain " intermittent, occurs with certain movements, especially at night. No medication currently.    Hyperthyroidism.  Thyroid function within normal limits during last evaluation. Thyroid function test today. Prescription refill for methimazole to [facility] in [place].    Premature atrial contractions (PACs).  Reports episodes of hard heartbeats at different times of the day, no dizziness or syncope. Cholesterol levels satisfactory. PSA normal. Thyroid function has been normal. Blood work to assess kidney and liver function.    Cerumen impaction.  Significant cerumen accumulation in both ears, difficult to assess for infection. Advised to use hydrogen peroxide or OTC kits like Debrox or Cerumenex for earwax removal, starting with the right ear. If right ear clears, proceed with left ear. If no improvement, consider water irrigation.    Health maintenance.  Unable to undergo AAA ultrasound and lung test due to insurance issues. Quit smoking in 2010. Blood work done on 12/09/2024. Elevated kidney function since heart attack. Cataract surgery on 03/06/2025 and 03/07/2025, needs glasses only for reading.    No longer candidate for low-dose CT scan for lung cancer screening.    PROCEDURE  Cataract surgery on 03/06/2025 and 03/07/2025 on both eyes.         Follow Up   Return in about 6 months (around 10/8/2025).    Patient was given instructions and counseling regarding his condition or for health maintenance advice. Please see specific information pulled into the AVS if appropriate.       Wilfredo Salvador MD    Patient or patient representative verbalized consent for the use of Ambient Listening during the visit with  Wilfredo Salvador MD for chart documentation. 4/8/2025  14:49 EDT

## 2025-04-09 LAB
ALBUMIN SERPL-MCNC: 4.6 G/DL (ref 3.9–4.9)
ALP SERPL-CCNC: 137 IU/L (ref 44–121)
ALT SERPL-CCNC: 24 IU/L (ref 0–44)
AST SERPL-CCNC: 19 IU/L (ref 0–40)
BILIRUB SERPL-MCNC: <0.2 MG/DL (ref 0–1.2)
BUN SERPL-MCNC: 26 MG/DL (ref 8–27)
BUN/CREAT SERPL: 17 (ref 10–24)
CALCIUM SERPL-MCNC: 9.2 MG/DL (ref 8.6–10.2)
CHLORIDE SERPL-SCNC: 104 MMOL/L (ref 96–106)
CO2 SERPL-SCNC: 21 MMOL/L (ref 20–29)
CREAT SERPL-MCNC: 1.5 MG/DL (ref 0.76–1.27)
EGFRCR SERPLBLD CKD-EPI 2021: 50 ML/MIN/1.73
GLOBULIN SER CALC-MCNC: 2.3 G/DL (ref 1.5–4.5)
GLUCOSE SERPL-MCNC: 141 MG/DL (ref 70–99)
POTASSIUM SERPL-SCNC: 4.5 MMOL/L (ref 3.5–5.2)
PROT SERPL-MCNC: 6.9 G/DL (ref 6–8.5)
SODIUM SERPL-SCNC: 139 MMOL/L (ref 134–144)
T4 FREE SERPL-MCNC: 1.06 NG/DL (ref 0.82–1.77)
TSH SERPL DL<=0.005 MIU/L-ACNC: 1.9 UIU/ML (ref 0.45–4.5)

## 2025-04-30 ENCOUNTER — HOSPITAL ENCOUNTER (OUTPATIENT)
Dept: HOSPITAL 22 - RT | Age: 70
Discharge: HOME | End: 2025-04-30
Payer: MEDICARE

## 2025-04-30 DIAGNOSIS — R00.2: Primary | ICD-10-CM

## 2025-04-30 DIAGNOSIS — I25.119: ICD-10-CM

## 2025-04-30 PROCEDURE — 93270 REMOTE 30 DAY ECG REV/REPORT: CPT

## 2025-05-30 ENCOUNTER — OFFICE VISIT (OUTPATIENT)
Dept: FAMILY MEDICINE CLINIC | Facility: CLINIC | Age: 70
End: 2025-05-30
Payer: MEDICARE

## 2025-05-30 VITALS
DIASTOLIC BLOOD PRESSURE: 60 MMHG | SYSTOLIC BLOOD PRESSURE: 110 MMHG | HEART RATE: 68 BPM | TEMPERATURE: 97.5 F | RESPIRATION RATE: 20 BRPM | HEIGHT: 71 IN | BODY MASS INDEX: 26.85 KG/M2 | WEIGHT: 191.8 LBS

## 2025-05-30 DIAGNOSIS — M54.12 CERVICAL RADICULOPATHY: Primary | ICD-10-CM

## 2025-05-30 PROCEDURE — 1126F AMNT PAIN NOTED NONE PRSNT: CPT | Performed by: FAMILY MEDICINE

## 2025-05-30 PROCEDURE — 99213 OFFICE O/P EST LOW 20 MIN: CPT | Performed by: FAMILY MEDICINE

## 2025-05-30 PROCEDURE — 3074F SYST BP LT 130 MM HG: CPT | Performed by: FAMILY MEDICINE

## 2025-05-30 PROCEDURE — 3078F DIAST BP <80 MM HG: CPT | Performed by: FAMILY MEDICINE

## 2025-05-30 RX ORDER — METHYLPREDNISOLONE 4 MG/1
TABLET ORAL
Qty: 21 TABLET | Refills: 0 | Status: SHIPPED | OUTPATIENT
Start: 2025-05-30

## 2025-05-30 RX ORDER — BACLOFEN 20 MG/1
20 TABLET ORAL 3 TIMES DAILY
Qty: 30 TABLET | Refills: 0 | Status: SHIPPED | OUTPATIENT
Start: 2025-05-30

## 2025-05-30 NOTE — PROGRESS NOTES
"Chief Complaint   Patient presents with    L shoulder pain      For the past two weeks        Subjective      Fam Tao is a 69 y.o. who presents for 2 weeks of posterior left shoulder and trapezius pain that radiates to the posterior elbow.  Pain is described as a stabbing sensation.  Patient has a difficult time sleeping at night as he prefers to sleep on his left side.  He is left-hand dominant and denies weakness.    Objective   Vital Signs:  /60   Pulse 68   Temp 97.5 °F (36.4 °C)   Resp 20   Ht 180.3 cm (71\")   Wt 87 kg (191 lb 12.8 oz)   BMI 26.75 kg/m²     Physical Exam  Vitals reviewed.   Musculoskeletal:      Left shoulder: No tenderness or bony tenderness. Normal range of motion. Normal strength.      Left upper arm: Normal.      Left elbow: Normal.      Cervical back: No tenderness or bony tenderness. Pain with movement present. Normal range of motion.      Comments: Cervical spine exam is significant for pain in the left trapezius area with cervical extension.   Neurological:      Mental Status: He is alert.      Sensory: No sensory deficit.      Motor: No weakness.          Result Review                     Assessment and Plan  Diagnoses and all orders for this visit:    1. Cervical radiculopathy (Primary)  -     baclofen (LIORESAL) 20 MG tablet; Take 1 tablet by mouth 3 (Three) Times a Day.  Dispense: 30 tablet; Refill: 0  -     methylPREDNISolone (MEDROL) 4 MG dose pack; Take as directed on package instructions.  Dispense: 21 tablet; Refill: 0    History and exam findings are consistent with mild cervical radiculopathy.  Trial of steroids and muscle relaxants will be used.  If no improvement refer to physical therapy        Follow Up  No follow-ups on file.  Patient was given instructions and counseling regarding his condition or for health maintenance advice. Please see specific information pulled into the AVS if appropriate.       "

## 2025-06-09 DIAGNOSIS — E05.90 HYPERTHYROIDISM: ICD-10-CM

## 2025-06-09 RX ORDER — METHIMAZOLE 10 MG/1
10 TABLET ORAL DAILY
Qty: 90 TABLET | Refills: 3 | OUTPATIENT
Start: 2025-06-09

## 2025-06-09 NOTE — TELEPHONE ENCOUNTER
Caller: Fam Tao    Relationship: Self    Best call back number: [phone]     Requested Prescriptions:   Requested Prescriptions     Pending Prescriptions Disp Refills    methIMAzole (TAPAZOLE) 10 MG tablet 90 tablet 3     Sig: Take 1 tablet by mouth Daily.        Pharmacy where request should be sent: [facility] #[id number] - [address] AT [facility] - [phone]  - [phone]     Last office visit with prescribing clinician: 4/8/2025   Last telemedicine visit with prescribing clinician: Visit date not found   Next office visit with prescribing clinician: 10/9/2025     Does the patient have less than a 3 day supply:  [x] Yes  [] No    Would you like a call back once the refill request has been completed: [] Yes [x] No    If the office needs to give you a call back, can they leave a voicemail: [] Yes [x] No    Dinora Barney Rep   06/09/25 10:14 EDT

## 2025-06-09 NOTE — TELEPHONE ENCOUNTER
90 day supply with three refills sent to pharmacy 4/08/2025. Three refills still on file at pharmacy.

## 2025-06-18 ENCOUNTER — OFFICE VISIT (OUTPATIENT)
Dept: FAMILY MEDICINE CLINIC | Facility: CLINIC | Age: 70
End: 2025-06-18
Payer: MEDICARE

## 2025-06-18 VITALS
WEIGHT: 193 LBS | BODY MASS INDEX: 27.02 KG/M2 | SYSTOLIC BLOOD PRESSURE: 120 MMHG | DIASTOLIC BLOOD PRESSURE: 60 MMHG | RESPIRATION RATE: 18 BRPM | TEMPERATURE: 97.1 F | HEIGHT: 71 IN | HEART RATE: 62 BPM

## 2025-06-18 DIAGNOSIS — H11.31 SUBCONJUNCTIVAL HEMORRHAGE OF RIGHT EYE: Primary | ICD-10-CM

## 2025-06-18 RX ORDER — DILTIAZEM HYDROCHLORIDE 120 MG/1
1 CAPSULE, EXTENDED RELEASE ORAL EVERY 12 HOURS SCHEDULED
COMMUNITY
Start: 2025-06-10

## 2025-06-18 NOTE — PROGRESS NOTES
Subjective   Fam Tao is a 69 y.o. male.     Eye Pain          R eye has been Red the past 24 hours  No eye pain  No change in vision  He just looked in the mirror and saw redness  No discharge, no matting noted    The following portions of the patient's history were reviewed and updated as appropriate: allergies, current medications, past family history, past medical history, past social history, past surgical history, and problem list.    Review of Systems   Eyes:  Positive for pain.       Objective   Physical Exam  Vitals and nursing note reviewed.   Constitutional:       General: He is not in acute distress.     Appearance: Normal appearance. He is well-developed.   Eyes:     Cardiovascular:      Rate and Rhythm: Normal rate and regular rhythm.      Heart sounds: Normal heart sounds.   Pulmonary:      Effort: Pulmonary effort is normal.      Breath sounds: Normal breath sounds.   Neurological:      Mental Status: He is alert and oriented to person, place, and time.   Psychiatric:         Mood and Affect: Mood normal.         Behavior: Behavior normal.         Thought Content: Thought content normal.         Judgment: Judgment normal.         Assessment & Plan   Diagnoses and all orders for this visit:    1. Subconjunctival hemorrhage of right eye (Primary)    Reassurance, wtch for vision change, eye pain, discharge and call back INB  Cool compresses as needed but this will resolve on its own in the next 7-10 days.

## (undated) DEVICE — AIRWY SZ11

## (undated) DEVICE — ANTIBACTERIAL UNDYED BRAIDED (POLYGLACTIN 910), SYNTHETIC ABSORBABLE SUTURE: Brand: COATED VICRYL

## (undated) DEVICE — GLV SURG BIOGEL LTX PF 8

## (undated) DEVICE — CONN TBG Y 6 IN 1 LF STRL

## (undated) DEVICE — NDL HYPO SFTY GLD 25G 5/8IN

## (undated) DEVICE — SYR LL 3CC

## (undated) DEVICE — SYR LUERLOK 20CC

## (undated) DEVICE — 3M™ IOBAN™ 2 ANTIMICROBIAL INCISE DRAPE 6650EZ: Brand: IOBAN™ 2

## (undated) DEVICE — SHEET, DRAPE, SPLIT, STERILE: Brand: MEDLINE

## (undated) DEVICE — CANNULA,ADULT,SOFT-TOUCH,7TUBE,SC: Brand: MEDLINE

## (undated) DEVICE — COVADERM PLUS: Brand: DEROYAL

## (undated) DEVICE — IRRIGATOR BULB ASEPTO 60CC STRL

## (undated) DEVICE — APPL CHLORAPREP W/TINT 26ML BLU

## (undated) DEVICE — SYR LUERLOK 5CC

## (undated) DEVICE — TP PAPR MICROPORE 3IN

## (undated) DEVICE — SUT SILK 2/0 SH 30IN K833H

## (undated) DEVICE — SHEET,DRAPE,40X58,STERILE: Brand: MEDLINE

## (undated) DEVICE — INTENDED USE FOR SURGICAL MARKING ON INTACT SKIN, ALSO PROVIDES A PERMANENT METHOD OF IDENTIFYING OBJECTS IN THE OPERATING ROOM: Brand: WRITESITE® REGULAR TIP SKIN MARKER

## (undated) DEVICE — SYR LUERLOK 10CC

## (undated) DEVICE — DRSNG SURESITE WNDW 4X4.5

## (undated) DEVICE — THE CATHETER REVISION KIT IS A STERILE KIT DESIGNED TO FACILITATE THE REPLACEMENT OF A SECTION OF THE INTRATHECAL CATHETER.  A REPLACEMENT CATHETER SEGMENT IS PROVIDED WITH A CATHETER CONNECTOR (AND SLEEVE) TO ALLOW THIS SEGMENT TO BE ATTACHED TO THE EXISTING CATHETER.: Brand: PROMETRA CATHETER REVISION KIT

## (undated) DEVICE — SYR LUERLOK 30CC

## (undated) DEVICE — ADHS LIQ MASTISOL 2/3ML

## (undated) DEVICE — ADHS SKIN DERMABOND TOP ADVANCED

## (undated) DEVICE — GOWN,PREVENTION PLUS,XXLARGE,STERILE: Brand: MEDLINE

## (undated) DEVICE — 3M™ STERI-STRIP™ REINFORCED ADHESIVE SKIN CLOSURES, R1547, 1/2 IN X 4 IN (12 MM X 100 MM), 6 STRIPS/ENVELOPE: Brand: 3M™ STERI-STRIP™

## (undated) DEVICE — TUBING, SUCTION, 1/4" X 10', STRAIGHT: Brand: MEDLINE

## (undated) DEVICE — DRAPE,TOP,102X53,STERILE: Brand: MEDLINE

## (undated) DEVICE — NDL HYPO ECLPS SFTY 22G 1 1/2IN